# Patient Record
Sex: FEMALE | Race: BLACK OR AFRICAN AMERICAN | NOT HISPANIC OR LATINO | URBAN - METROPOLITAN AREA
[De-identification: names, ages, dates, MRNs, and addresses within clinical notes are randomized per-mention and may not be internally consistent; named-entity substitution may affect disease eponyms.]

---

## 2017-01-06 ENCOUNTER — EMERGENCY (EMERGENCY)
Facility: HOSPITAL | Age: 27
LOS: 1 days | Discharge: HOME | End: 2017-01-06
Attending: EMERGENCY MEDICINE | Admitting: EMERGENCY MEDICINE
Payer: COMMERCIAL

## 2017-01-06 VITALS
RESPIRATION RATE: 18 BRPM | HEART RATE: 80 BPM | DIASTOLIC BLOOD PRESSURE: 79 MMHG | SYSTOLIC BLOOD PRESSURE: 127 MMHG | TEMPERATURE: 98 F | OXYGEN SATURATION: 100 %

## 2017-01-06 PROCEDURE — 99283 EMERGENCY DEPT VISIT LOW MDM: CPT

## 2017-01-06 PROCEDURE — 73080 X-RAY EXAM OF ELBOW: CPT | Mod: 26,RT

## 2017-01-06 RX ORDER — IBUPROFEN 200 MG
600 TABLET ORAL ONCE
Qty: 0 | Refills: 0 | Status: COMPLETED | OUTPATIENT
Start: 2017-01-06 | End: 2017-01-06

## 2017-01-06 RX ORDER — DIAZEPAM 5 MG
1 TABLET ORAL
Qty: 10 | Refills: 0
Start: 2017-01-06

## 2017-01-06 RX ORDER — IBUPROFEN 200 MG
1 TABLET ORAL
Qty: 15 | Refills: 0
Start: 2017-01-06

## 2017-01-06 RX ADMIN — Medication 600 MILLIGRAM(S): at 11:33

## 2017-01-06 RX ADMIN — Medication 600 MILLIGRAM(S): at 10:32

## 2017-01-06 NOTE — ED PROVIDER NOTE - CARE PLAN
Principal Discharge DX:	Elbow pain  Instructions for follow-up, activity and diet:	Follow up with your Doctor in 1-2 days.  Follow up with orthopedics in 1-2 days.  Rest.  Ice 3-4 x a day.  Sling for a maximum of 2 days.  Take Motrin 600mg orally every 8 hours as needed for pain take with food.  Valium 5mg one tablet orally every 8 hours as needed for spasm don’t drive or drink alcohol while taking this medication.  Return to the ER for any persistent/worsening or new symptoms weakness, numbness or any concerning symptoms. Principal Discharge DX:	Elbow strain, right, initial encounter  Instructions for follow-up, activity and diet:	Follow up with your Doctor in 1-2 days.  Follow up with orthopedics in 1-2 days.  Rest.  Ice 3-4 x a day.  Sling for a maximum of 2 days.  Take Motrin 600mg orally every 8 hours as needed for pain take with food.  Valium 5mg one tablet orally every 8 hours as needed for spasm don’t drive or drink alcohol while taking this medication.  Return to the ER for any persistent/worsening or new symptoms weakness, numbness or any concerning symptoms.

## 2017-01-06 NOTE — ED PROVIDER NOTE - OBJECTIVE STATEMENT
27 y/o F pt, right hand dominant, with no significant PMHx presents to the ED for right elbow pain described as throbbing x2 days. States grabbing things at work. States applying ice to the affected area which provided no relief. Has not taken any pain medication. Denies fever, chills, trauma, fall, or any other complaints. NKDA. LMP: 2 weeks ago. Denies chance of pregnancy.

## 2017-01-06 NOTE — ED PROVIDER NOTE - NS ED MD SCRIBE ATTENDING SCRIBE SECTIONS
REVIEW OF SYSTEMS/HIV/HISTORY OF PRESENT ILLNESS/PHYSICAL EXAM/PAST MEDICAL/SURGICAL/SOCIAL HISTORY/DISPOSITION/VITAL SIGNS( Pullset)

## 2017-01-06 NOTE — ED PROVIDER NOTE - PLAN OF CARE
Follow up with your Doctor in 1-2 days.  Follow up with orthopedics in 1-2 days.  Rest.  Ice 3-4 x a day.  Sling for a maximum of 2 days.  Take Motrin 600mg orally every 8 hours as needed for pain take with food.  Valium 5mg one tablet orally every 8 hours as needed for spasm don’t drive or drink alcohol while taking this medication.  Return to the ER for any persistent/worsening or new symptoms weakness, numbness or any concerning symptoms.

## 2017-01-06 NOTE — ED PROCEDURE NOTE - CPROC ED PERI NEUROVAS EVAL1
Pre-application: Motor, sensory, and vascular responses intact in the injured extremity ./Post-application: Motor, sensory, and vascular responses intact in the injured extremity.

## 2017-01-06 NOTE — ED ADULT TRIAGE NOTE - CHIEF COMPLAINT QUOTE
Atraumatic left elbow pain x 2 days. Positive radial pulse noted Atraumatic left elbow pain x 2 days. Positive radial pulse noted.  Recent travel to [facility].

## 2017-01-06 NOTE — ED PROVIDER NOTE - PROGRESS NOTE DETAILS
AIDEE Walker: pt feels better pain improved.  X-ray negative no fracture no dislocation.  Able to flex and extend elbow.  Sling placed.  Results reviewed and provided to patient.  Discharge reviewed and discussed with patient.

## 2017-01-06 NOTE — ED PROVIDER NOTE - UPPER EXTREMITY EXAM, RIGHT
Limited extension. Pain with pronation and supination. Tightness at antecubital space. Normal abduction and adduction.

## 2018-01-04 ENCOUNTER — APPOINTMENT (OUTPATIENT)
Dept: DERMATOLOGY | Facility: CLINIC | Age: 28
End: 2018-01-04

## 2018-02-11 ENCOUNTER — RESULT REVIEW (OUTPATIENT)
Age: 28
End: 2018-02-11

## 2018-06-13 ENCOUNTER — TRANSCRIPTION ENCOUNTER (OUTPATIENT)
Age: 28
End: 2018-06-13

## 2018-10-08 ENCOUNTER — APPOINTMENT (OUTPATIENT)
Dept: OPHTHALMOLOGY | Facility: CLINIC | Age: 28
End: 2018-10-08
Payer: COMMERCIAL

## 2018-10-08 DIAGNOSIS — H00.024 HORDEOLUM INTERNUM LEFT UPPER EYELID: ICD-10-CM

## 2018-10-08 PROCEDURE — 99203 OFFICE O/P NEW LOW 30 MIN: CPT

## 2018-10-22 ENCOUNTER — APPOINTMENT (OUTPATIENT)
Dept: OPHTHALMOLOGY | Facility: CLINIC | Age: 28
End: 2018-10-22
Payer: COMMERCIAL

## 2018-10-22 PROCEDURE — 92133 CPTRZD OPH DX IMG PST SGM ON: CPT

## 2018-10-22 PROCEDURE — 92014 COMPRE OPH EXAM EST PT 1/>: CPT

## 2018-10-22 PROCEDURE — 76514 ECHO EXAM OF EYE THICKNESS: CPT

## 2018-10-22 PROCEDURE — 92083 EXTENDED VISUAL FIELD XM: CPT

## 2019-01-11 ENCOUNTER — EMERGENCY (EMERGENCY)
Facility: HOSPITAL | Age: 29
LOS: 1 days | Discharge: HOME | End: 2019-01-11
Attending: EMERGENCY MEDICINE | Admitting: EMERGENCY MEDICINE
Payer: COMMERCIAL

## 2019-01-11 VITALS
TEMPERATURE: 98 F | SYSTOLIC BLOOD PRESSURE: 146 MMHG | DIASTOLIC BLOOD PRESSURE: 87 MMHG | OXYGEN SATURATION: 100 % | RESPIRATION RATE: 18 BRPM | HEART RATE: 73 BPM

## 2019-01-11 VITALS
RESPIRATION RATE: 18 BRPM | DIASTOLIC BLOOD PRESSURE: 95 MMHG | OXYGEN SATURATION: 100 % | TEMPERATURE: 99 F | SYSTOLIC BLOOD PRESSURE: 139 MMHG | HEART RATE: 92 BPM

## 2019-01-11 LAB
ALBUMIN SERPL ELPH-MCNC: 4.3 G/DL — SIGNIFICANT CHANGE UP (ref 3.3–5)
ALP SERPL-CCNC: 81 U/L — SIGNIFICANT CHANGE UP (ref 40–120)
ALT FLD-CCNC: 23 U/L — SIGNIFICANT CHANGE UP (ref 4–33)
ANION GAP SERPL CALC-SCNC: 10 MEQ/L — SIGNIFICANT CHANGE UP (ref 7–14)
APPEARANCE UR: CLEAR — SIGNIFICANT CHANGE UP
APTT BLD: 38.6 SEC — HIGH (ref 27.5–36.3)
AST SERPL-CCNC: 18 U/L — SIGNIFICANT CHANGE UP (ref 4–32)
BASOPHILS # BLD AUTO: 0.04 K/UL — SIGNIFICANT CHANGE UP (ref 0–0.2)
BASOPHILS NFR BLD AUTO: 0.5 % — SIGNIFICANT CHANGE UP (ref 0–2)
BILIRUB SERPL-MCNC: 0.3 MG/DL — SIGNIFICANT CHANGE UP (ref 0.2–1.2)
BILIRUB UR-MCNC: NEGATIVE — SIGNIFICANT CHANGE UP
BLD GP AB SCN SERPL QL: NEGATIVE — SIGNIFICANT CHANGE UP
BLOOD UR QL VISUAL: SIGNIFICANT CHANGE UP
BUN SERPL-MCNC: 8 MG/DL — SIGNIFICANT CHANGE UP (ref 7–23)
CALCIUM SERPL-MCNC: 10 MG/DL — SIGNIFICANT CHANGE UP (ref 8.4–10.5)
CHLORIDE SERPL-SCNC: 104 MMOL/L — SIGNIFICANT CHANGE UP (ref 98–107)
CO2 SERPL-SCNC: 24 MMOL/L — SIGNIFICANT CHANGE UP (ref 22–31)
COLOR SPEC: SIGNIFICANT CHANGE UP
CREAT SERPL-MCNC: 0.72 MG/DL — SIGNIFICANT CHANGE UP (ref 0.5–1.3)
EOSINOPHIL # BLD AUTO: 0.28 K/UL — SIGNIFICANT CHANGE UP (ref 0–0.5)
EOSINOPHIL NFR BLD AUTO: 3.2 % — SIGNIFICANT CHANGE UP (ref 0–6)
GLUCOSE SERPL-MCNC: 92 MG/DL — SIGNIFICANT CHANGE UP (ref 70–99)
GLUCOSE UR-MCNC: NEGATIVE — SIGNIFICANT CHANGE UP
HCT VFR BLD CALC: 41.8 % — SIGNIFICANT CHANGE UP (ref 34.5–45)
HGB BLD-MCNC: 14.1 G/DL — SIGNIFICANT CHANGE UP (ref 11.5–15.5)
IMM GRANULOCYTES NFR BLD AUTO: 0.2 % — SIGNIFICANT CHANGE UP (ref 0–1.5)
INR BLD: 1.03 — SIGNIFICANT CHANGE UP (ref 0.88–1.17)
KETONES UR-MCNC: NEGATIVE — SIGNIFICANT CHANGE UP
LEUKOCYTE ESTERASE UR-ACNC: NEGATIVE — SIGNIFICANT CHANGE UP
LIDOCAIN IGE QN: 27.9 U/L — SIGNIFICANT CHANGE UP (ref 7–60)
LYMPHOCYTES # BLD AUTO: 4.28 K/UL — HIGH (ref 1–3.3)
LYMPHOCYTES # BLD AUTO: 48.5 % — HIGH (ref 13–44)
MCHC RBC-ENTMCNC: 29.9 PG — SIGNIFICANT CHANGE UP (ref 27–34)
MCHC RBC-ENTMCNC: 33.7 % — SIGNIFICANT CHANGE UP (ref 32–36)
MCV RBC AUTO: 88.7 FL — SIGNIFICANT CHANGE UP (ref 80–100)
MONOCYTES # BLD AUTO: 0.72 K/UL — SIGNIFICANT CHANGE UP (ref 0–0.9)
MONOCYTES NFR BLD AUTO: 8.2 % — SIGNIFICANT CHANGE UP (ref 2–14)
NEUTROPHILS # BLD AUTO: 3.48 K/UL — SIGNIFICANT CHANGE UP (ref 1.8–7.4)
NEUTROPHILS NFR BLD AUTO: 39.4 % — LOW (ref 43–77)
NITRITE UR-MCNC: NEGATIVE — SIGNIFICANT CHANGE UP
NRBC # FLD: 0 K/UL — LOW (ref 25–125)
PH UR: 6 — SIGNIFICANT CHANGE UP (ref 5–8)
PLATELET # BLD AUTO: 314 K/UL — SIGNIFICANT CHANGE UP (ref 150–400)
PMV BLD: 10.4 FL — SIGNIFICANT CHANGE UP (ref 7–13)
POTASSIUM SERPL-MCNC: 3.9 MMOL/L — SIGNIFICANT CHANGE UP (ref 3.5–5.3)
POTASSIUM SERPL-SCNC: 3.9 MMOL/L — SIGNIFICANT CHANGE UP (ref 3.5–5.3)
PROT SERPL-MCNC: 7.5 G/DL — SIGNIFICANT CHANGE UP (ref 6–8.3)
PROT UR-MCNC: NEGATIVE — SIGNIFICANT CHANGE UP
PROTHROM AB SERPL-ACNC: 11.7 SEC — SIGNIFICANT CHANGE UP (ref 9.8–13.1)
RBC # BLD: 4.71 M/UL — SIGNIFICANT CHANGE UP (ref 3.8–5.2)
RBC # FLD: 12 % — SIGNIFICANT CHANGE UP (ref 10.3–14.5)
RBC CASTS # UR COMP ASSIST: SIGNIFICANT CHANGE UP (ref 0–?)
RH IG SCN BLD-IMP: POSITIVE — SIGNIFICANT CHANGE UP
SODIUM SERPL-SCNC: 138 MMOL/L — SIGNIFICANT CHANGE UP (ref 135–145)
SP GR SPEC: 1.02 — SIGNIFICANT CHANGE UP (ref 1–1.04)
UROBILINOGEN FLD QL: NORMAL — SIGNIFICANT CHANGE UP
WBC # BLD: 8.82 K/UL — SIGNIFICANT CHANGE UP (ref 3.8–10.5)
WBC # FLD AUTO: 8.82 K/UL — SIGNIFICANT CHANGE UP (ref 3.8–10.5)
WBC UR QL: SIGNIFICANT CHANGE UP (ref 0–?)

## 2019-01-11 PROCEDURE — 74177 CT ABD & PELVIS W/CONTRAST: CPT | Mod: 26

## 2019-01-11 PROCEDURE — 76830 TRANSVAGINAL US NON-OB: CPT | Mod: 26

## 2019-01-11 PROCEDURE — 99284 EMERGENCY DEPT VISIT MOD MDM: CPT | Mod: 25

## 2019-01-11 PROCEDURE — 76705 ECHO EXAM OF ABDOMEN: CPT | Mod: 26

## 2019-01-11 RX ORDER — SODIUM CHLORIDE 9 MG/ML
1000 INJECTION, SOLUTION INTRAVENOUS ONCE
Qty: 0 | Refills: 0 | Status: COMPLETED | OUTPATIENT
Start: 2019-01-11 | End: 2019-01-11

## 2019-01-11 RX ORDER — MORPHINE SULFATE 50 MG/1
2 CAPSULE, EXTENDED RELEASE ORAL ONCE
Qty: 0 | Refills: 0 | Status: DISCONTINUED | OUTPATIENT
Start: 2019-01-11 | End: 2019-01-11

## 2019-01-11 RX ADMIN — SODIUM CHLORIDE 2000 MILLILITER(S): 9 INJECTION, SOLUTION INTRAVENOUS at 02:36

## 2019-01-11 NOTE — ED ADULT TRIAGE NOTE - CHIEF COMPLAINT QUOTE
pt. c/o dull RLQ pain x 2 weeks, turned sharp over the past 2 days, reports nausea w/out vomiting, no diarrhea or urinary symptoms, LMP 2 weeks ago.  PMHx asthma.

## 2019-01-11 NOTE — ED ADULT NURSE REASSESSMENT NOTE - NS ED NURSE REASSESS COMMENT FT1
Pt reports abd pain 7/10. Pt refusal of pain medications, reports pain is at a tolerable level. Pt appears resting, comfortable, will continue to monitor.

## 2019-01-11 NOTE — ED PROVIDER NOTE - CARE PLAN
Principal Discharge DX:	Right lower quadrant abdominal pain  Goal:	RLQ pain  Assessment and plan of treatment:	Imaging negative for pancreatitis, cholecystitis, appendicitis, ovarian cysts, torsion or pregnancy. Might be related to menses

## 2019-01-11 NOTE — ED PROVIDER NOTE - PLAN OF CARE
RLQ pain Imaging negative for pancreatitis, cholecystitis, appendicitis, ovarian cysts, torsion or pregnancy. Might be related to menses

## 2019-01-11 NOTE — ED PROVIDER NOTE - NSFOLLOWUPINSTRUCTIONS_ED_ALL_ED_FT
Please followup with your OBGYN within one week for possible endometriosis and Primary care doctor. The abdominal pain was not due to pancreatitis, cholecystitis, appendicitis, ovarian cysts, torsion or pregnancy. If worsening abdominal pain and start to have a fever then please call your doctor consider coming back to the ED

## 2019-01-11 NOTE — ED PROVIDER NOTE - OBJECTIVE STATEMENT
27y/o F w/ no PMH presents w/ RLQ pain. 29y/o F w/ no PMH presents w/ RLQ pain. She was having RLQ pain starting 2 weeks ago and it was dull during that time and didn't think much of it. The starting 1/10 she developed sharp lower back pain at work and it became constant and increasing. She also has associated nausea but no vomiting, no dysuria, f/CP/SOB sick contacts, recent travels and still is able to tolerate PO, last ate at 10pm. 29y/o F w/ no PMH presents w/ RLQ pain. She was having RLQ pain starting 2 weeks ago and it was dull during that time and didn't think much of it. The starting 1/10 she developed sharp lower back pain at work and it became constant and increasing. She also has associated nausea but no vomiting, no dysuria, f/CP/SOB sick contacts, recent travels and still is able to tolerate PO, last ate at 10pm. No vaginal discharge, dysuria, last 27y/o F w/ no PMH presents w/ RLQ pain. She was having RLQ pain starting 2 weeks ago and it was dull during that time and didn't think much of it. The starting 1/10 she developed sharp lower back pain at work and it became constant and increasing. She also has associated nausea but no vomiting, no dysuria, f/CP/SOB sick contacts, recent travels and still is able to tolerate PO, last ate at 10pm. No vaginal discharge, dysuria, last sexual activity 1 yr ago and neg STDs as per her annual exam w/ OBGYN and LMP 2 wks ago. 27y/o F w/ no PMH presents w/ RLQ pain. She was having RLQ pain starting 2 weeks ago and it was dull during that time and didn't think much of it. The starting 1/10 she developed sharp lower back and RLQ pain at work and it became constant and increasing. She also has associated nausea but no vomiting, no dysuria, f/CP/SOB sick contacts, recent travels and still is able to tolerate PO, last ate at 10pm. No vaginal discharge, dysuria, last sexual activity 1 yr ago and neg STDs as per her annual exam w/ OBGYN and LMP 2 wks ago.

## 2019-01-11 NOTE — ED PROVIDER NOTE - MEDICAL DECISION MAKING DETAILS
29y/o F w/ no PMH presents w/ RLQ pain, r/o cholecystits, appendicitis and pancreatitis f/u labs, lipase, US and CTAP

## 2019-01-11 NOTE — ED PROVIDER NOTE - ATTENDING CONTRIBUTION TO CARE
MD Quintana:  I performed a face to face bedside interview with patient regarding history of present illness, review of symptoms and past medical history. I completed an independent physical exam(documented below).  I have discussed patient's plan of care with resident.   I agree with note as stated above, having amended the EMR as needed to reflect my findings. I have discussed the assessment and plan of care.  This includes during the time I functioned as the attending physician for this patient.  PE:  Gen: Alert, NAD  Head: NC, AT,  EOMI, normal lids/conjunctiva  ENT:  normal hearing, patent oropharynx without erythema/exudate  Neck: +supple, no tenderness/meningismus/JVD, +Trachea midline  Chest: no chest wall tenderness, equal chest rise  Pulm: Bilateral BS, normal resp effort, no wheeze/stridor/retractions  CV: RRR, no M/R/G, +dist pulses  Abd: +BS, soft, ND, +ttp in RLQ >RUQ>epigastric  Rectal: deferred  Mskel: no edema/erythema/cyanosis  Skin: no rash  Neuro: AAOx3  MDM:   27yo F, denies significant pmh, c/o RLQ pain X 2 wks, dull, constant, associated w/ lower back pain, +nausea but no emesis. LMP 2 wks ago. negative ucg. Ddx includes msk pain vs renal stone vs cholecystitis/pancreatitis vs acute appy vs ovarian pathology. Labs, meds, fluids, imaging, reassess.

## 2019-01-16 ENCOUNTER — APPOINTMENT (OUTPATIENT)
Dept: OPHTHALMOLOGY | Facility: CLINIC | Age: 29
End: 2019-01-16
Payer: COMMERCIAL

## 2019-01-16 PROCEDURE — 92012 INTRM OPH EXAM EST PATIENT: CPT

## 2019-01-16 PROCEDURE — 92082 INTERMEDIATE VISUAL FIELD XM: CPT

## 2019-01-16 PROCEDURE — ZZZZZ: CPT

## 2019-06-25 ENCOUNTER — EMERGENCY (EMERGENCY)
Facility: HOSPITAL | Age: 29
LOS: 1 days | Discharge: HOME | End: 2019-06-25
Attending: EMERGENCY MEDICINE | Admitting: EMERGENCY MEDICINE
Payer: COMMERCIAL

## 2019-06-25 VITALS
DIASTOLIC BLOOD PRESSURE: 77 MMHG | TEMPERATURE: 98 F | RESPIRATION RATE: 16 BRPM | SYSTOLIC BLOOD PRESSURE: 133 MMHG | OXYGEN SATURATION: 99 % | HEART RATE: 77 BPM

## 2019-06-25 VITALS
WEIGHT: 223.99 LBS | DIASTOLIC BLOOD PRESSURE: 98 MMHG | RESPIRATION RATE: 16 BRPM | HEIGHT: 67 IN | TEMPERATURE: 98 F | HEART RATE: 95 BPM | SYSTOLIC BLOOD PRESSURE: 144 MMHG

## 2019-06-25 LAB
ALBUMIN SERPL ELPH-MCNC: 4.3 G/DL — SIGNIFICANT CHANGE UP (ref 3.3–5)
ALP SERPL-CCNC: 78 U/L — SIGNIFICANT CHANGE UP (ref 40–120)
ALT FLD-CCNC: 27 U/L — SIGNIFICANT CHANGE UP (ref 4–33)
ANION GAP SERPL CALC-SCNC: 12 MMO/L — SIGNIFICANT CHANGE UP (ref 7–14)
APPEARANCE UR: CLEAR — SIGNIFICANT CHANGE UP
AST SERPL-CCNC: 28 U/L — SIGNIFICANT CHANGE UP (ref 4–32)
BASOPHILS # BLD AUTO: 0.06 K/UL — SIGNIFICANT CHANGE UP (ref 0–0.2)
BASOPHILS NFR BLD AUTO: 0.9 % — SIGNIFICANT CHANGE UP (ref 0–2)
BILIRUB SERPL-MCNC: 0.8 MG/DL — SIGNIFICANT CHANGE UP (ref 0.2–1.2)
BILIRUB UR-MCNC: NEGATIVE — SIGNIFICANT CHANGE UP
BLOOD UR QL VISUAL: SIGNIFICANT CHANGE UP
BUN SERPL-MCNC: 8 MG/DL — SIGNIFICANT CHANGE UP (ref 7–23)
CALCIUM SERPL-MCNC: 9.7 MG/DL — SIGNIFICANT CHANGE UP (ref 8.4–10.5)
CHLORIDE SERPL-SCNC: 100 MMOL/L — SIGNIFICANT CHANGE UP (ref 98–107)
CO2 SERPL-SCNC: 24 MMOL/L — SIGNIFICANT CHANGE UP (ref 22–31)
COLOR SPEC: SIGNIFICANT CHANGE UP
CREAT SERPL-MCNC: 0.67 MG/DL — SIGNIFICANT CHANGE UP (ref 0.5–1.3)
EOSINOPHIL # BLD AUTO: 0.35 K/UL — SIGNIFICANT CHANGE UP (ref 0–0.5)
EOSINOPHIL NFR BLD AUTO: 5.2 % — SIGNIFICANT CHANGE UP (ref 0–6)
GLUCOSE SERPL-MCNC: 77 MG/DL — SIGNIFICANT CHANGE UP (ref 70–99)
GLUCOSE UR-MCNC: NEGATIVE — SIGNIFICANT CHANGE UP
HCT VFR BLD CALC: 42.5 % — SIGNIFICANT CHANGE UP (ref 34.5–45)
HGB BLD-MCNC: 14.1 G/DL — SIGNIFICANT CHANGE UP (ref 11.5–15.5)
IMM GRANULOCYTES NFR BLD AUTO: 0.4 % — SIGNIFICANT CHANGE UP (ref 0–1.5)
KETONES UR-MCNC: NEGATIVE — SIGNIFICANT CHANGE UP
LEUKOCYTE ESTERASE UR-ACNC: NEGATIVE — SIGNIFICANT CHANGE UP
LYMPHOCYTES # BLD AUTO: 3 K/UL — SIGNIFICANT CHANGE UP (ref 1–3.3)
LYMPHOCYTES # BLD AUTO: 44.5 % — HIGH (ref 13–44)
MCHC RBC-ENTMCNC: 29.4 PG — SIGNIFICANT CHANGE UP (ref 27–34)
MCHC RBC-ENTMCNC: 33.2 % — SIGNIFICANT CHANGE UP (ref 32–36)
MCV RBC AUTO: 88.7 FL — SIGNIFICANT CHANGE UP (ref 80–100)
MONOCYTES # BLD AUTO: 0.56 K/UL — SIGNIFICANT CHANGE UP (ref 0–0.9)
MONOCYTES NFR BLD AUTO: 8.3 % — SIGNIFICANT CHANGE UP (ref 2–14)
NEUTROPHILS # BLD AUTO: 2.74 K/UL — SIGNIFICANT CHANGE UP (ref 1.8–7.4)
NEUTROPHILS NFR BLD AUTO: 40.7 % — LOW (ref 43–77)
NITRITE UR-MCNC: NEGATIVE — SIGNIFICANT CHANGE UP
NRBC # FLD: 0.02 K/UL — SIGNIFICANT CHANGE UP (ref 0–0)
PH UR: 6 — SIGNIFICANT CHANGE UP (ref 5–8)
PLATELET # BLD AUTO: 352 K/UL — SIGNIFICANT CHANGE UP (ref 150–400)
PMV BLD: 10.6 FL — SIGNIFICANT CHANGE UP (ref 7–13)
POTASSIUM SERPL-MCNC: 4 MMOL/L — SIGNIFICANT CHANGE UP (ref 3.5–5.3)
POTASSIUM SERPL-SCNC: 4 MMOL/L — SIGNIFICANT CHANGE UP (ref 3.5–5.3)
PROT SERPL-MCNC: 7.6 G/DL — SIGNIFICANT CHANGE UP (ref 6–8.3)
PROT UR-MCNC: NEGATIVE — SIGNIFICANT CHANGE UP
RBC # BLD: 4.79 M/UL — SIGNIFICANT CHANGE UP (ref 3.8–5.2)
RBC # FLD: 12.3 % — SIGNIFICANT CHANGE UP (ref 10.3–14.5)
SODIUM SERPL-SCNC: 136 MMOL/L — SIGNIFICANT CHANGE UP (ref 135–145)
SP GR SPEC: 1.01 — SIGNIFICANT CHANGE UP (ref 1–1.04)
UROBILINOGEN FLD QL: NORMAL — SIGNIFICANT CHANGE UP
WBC # BLD: 6.74 K/UL — SIGNIFICANT CHANGE UP (ref 3.8–10.5)
WBC # FLD AUTO: 6.74 K/UL — SIGNIFICANT CHANGE UP (ref 3.8–10.5)

## 2019-06-25 PROCEDURE — 76512 OPH US DX B-SCAN: CPT | Mod: 26,RT

## 2019-06-25 PROCEDURE — 99284 EMERGENCY DEPT VISIT MOD MDM: CPT | Mod: 25

## 2019-06-25 PROCEDURE — 71046 X-RAY EXAM CHEST 2 VIEWS: CPT | Mod: 26

## 2019-06-25 PROCEDURE — 70450 CT HEAD/BRAIN W/O DYE: CPT | Mod: 26

## 2019-06-25 NOTE — ED ADULT TRIAGE NOTE - CHIEF COMPLAINT QUOTE
Pt states her b/p is high and she feels like she is going to pass out pt well appearing in triage denies any pain   PMH: denies

## 2019-06-25 NOTE — ED PROVIDER NOTE - CLINICAL SUMMARY MEDICAL DECISION MAKING FREE TEXT BOX
28 y.o female pmhx of asthma coming in with feeling lightheaded/dizziness for the last 2 days. Exam unremarkable. Will check basic labs, ekg, xray chest, CT head, reassess.

## 2019-06-25 NOTE — ED PROVIDER NOTE - OBJECTIVE STATEMENT
28 y.o female pmhx of asthma coming in with feeling lightheaded/dizziness for the last 2 days. Pt states last night had gradual onset of headache that resolved on its own , checked BP and states was elevated. States was at work today and felt the dizziness sensation, went to drive to her doctor but felt like she was going to pass out so came to the ED. Denies fevers, chills, blurry vision, chest pain, sob, cough, n/v/d, abdominal pain, numbness, tingling, weakness, urinary symptoms. Denies any chance of pregnancy. Wears contacts for distance. Family hx of HTN.

## 2019-06-25 NOTE — ED PROVIDER NOTE - NSFOLLOWUPINSTRUCTIONS_ED_ALL_ED_FT
Rest, drink plenty of fluids.   Follow up with your primary care physician in 48-72 hours- bring copies of your results.   Follow up with Neurology- referral list provided.   Return to the Emergency Department for worsening or persistent symptoms OR ANY NEW OR CONCERNING SYMPTOMS.

## 2019-06-25 NOTE — ED PROVIDER NOTE - PROGRESS NOTE DETAILS
beau ortiz: labs and imaging wnl. pt ambulating, feel well would like to go. Will dc home with neuro follow up. return precautions given. feels improved, POCT ocular US shows optic nerve sheath diameter wnl, w/u negative.

## 2019-06-27 ENCOUNTER — APPOINTMENT (OUTPATIENT)
Dept: DERMATOLOGY | Facility: CLINIC | Age: 29
End: 2019-06-27
Payer: COMMERCIAL

## 2019-06-27 VITALS — BODY MASS INDEX: 35.16 KG/M2 | HEIGHT: 67 IN | WEIGHT: 224 LBS

## 2019-06-27 PROCEDURE — 99203 OFFICE O/P NEW LOW 30 MIN: CPT

## 2019-07-02 LAB
ALBUMIN SERPL ELPH-MCNC: 4.4 G/DL
ALP BLD-CCNC: 80 U/L
ALT SERPL-CCNC: 23 U/L
ANION GAP SERPL CALC-SCNC: 10 MMOL/L
AST SERPL-CCNC: 19 U/L
BILIRUB SERPL-MCNC: 0.5 MG/DL
BUN SERPL-MCNC: 8 MG/DL
CALCIUM SERPL-MCNC: 9.9 MG/DL
CHLORIDE SERPL-SCNC: 103 MMOL/L
CO2 SERPL-SCNC: 24 MMOL/L
CREAT SERPL-MCNC: 0.75 MG/DL
POTASSIUM SERPL-SCNC: 4.7 MMOL/L
PROT SERPL-MCNC: 7.4 G/DL
SODIUM SERPL-SCNC: 137 MMOL/L
TESTOST SERPL-MCNC: 29.3 NG/DL

## 2019-07-16 LAB — DHEA-SULFATE, SERUM: 208 UG/DL

## 2019-08-22 ENCOUNTER — RESULT REVIEW (OUTPATIENT)
Age: 29
End: 2019-08-22

## 2019-09-03 ENCOUNTER — NON-APPOINTMENT (OUTPATIENT)
Age: 29
End: 2019-09-03

## 2019-09-03 ENCOUNTER — APPOINTMENT (OUTPATIENT)
Dept: OPHTHALMOLOGY | Facility: CLINIC | Age: 29
End: 2019-09-03
Payer: COMMERCIAL

## 2019-09-03 PROCEDURE — 92310B: CUSTOM

## 2019-09-10 ENCOUNTER — APPOINTMENT (OUTPATIENT)
Dept: OPHTHALMOLOGY | Facility: CLINIC | Age: 29
End: 2019-09-10
Payer: COMMERCIAL

## 2019-09-10 ENCOUNTER — NON-APPOINTMENT (OUTPATIENT)
Age: 29
End: 2019-09-10

## 2019-09-10 PROCEDURE — 92083 EXTENDED VISUAL FIELD XM: CPT

## 2019-09-10 PROCEDURE — 76514 ECHO EXAM OF EYE THICKNESS: CPT

## 2019-09-10 PROCEDURE — 92012 INTRM OPH EXAM EST PATIENT: CPT

## 2019-09-10 PROCEDURE — 92133 CPTRZD OPH DX IMG PST SGM ON: CPT

## 2019-09-19 ENCOUNTER — APPOINTMENT (OUTPATIENT)
Dept: OPHTHALMOLOGY | Facility: CLINIC | Age: 29
End: 2019-09-19

## 2019-10-01 ENCOUNTER — APPOINTMENT (OUTPATIENT)
Dept: DERMATOLOGY | Facility: CLINIC | Age: 29
End: 2019-10-01

## 2019-10-03 ENCOUNTER — APPOINTMENT (OUTPATIENT)
Dept: ULTRASOUND IMAGING | Facility: HOSPITAL | Age: 29
End: 2019-10-03

## 2019-10-22 ENCOUNTER — OUTPATIENT (OUTPATIENT)
Dept: OUTPATIENT SERVICES | Facility: HOSPITAL | Age: 29
LOS: 1 days | End: 2019-10-22
Payer: COMMERCIAL

## 2019-10-22 ENCOUNTER — APPOINTMENT (OUTPATIENT)
Dept: ULTRASOUND IMAGING | Facility: IMAGING CENTER | Age: 29
End: 2019-10-22
Payer: COMMERCIAL

## 2019-10-22 DIAGNOSIS — Z00.8 ENCOUNTER FOR OTHER GENERAL EXAMINATION: ICD-10-CM

## 2019-10-22 PROCEDURE — 76856 US EXAM PELVIC COMPLETE: CPT

## 2019-10-22 PROCEDURE — 76856 US EXAM PELVIC COMPLETE: CPT | Mod: 26

## 2019-11-14 ENCOUNTER — NON-APPOINTMENT (OUTPATIENT)
Age: 29
End: 2019-11-14

## 2019-11-14 ENCOUNTER — APPOINTMENT (OUTPATIENT)
Dept: OPHTHALMOLOGY | Facility: CLINIC | Age: 29
End: 2019-11-14
Payer: COMMERCIAL

## 2019-11-14 PROCEDURE — V2520B2: CUSTOM

## 2019-11-14 PROCEDURE — 92331: CPT | Mod: NC

## 2021-08-21 ENCOUNTER — APPOINTMENT (OUTPATIENT)
Dept: DISASTER EMERGENCY | Facility: OTHER | Age: 31
End: 2021-08-21

## 2021-08-28 ENCOUNTER — APPOINTMENT (OUTPATIENT)
Dept: DISASTER EMERGENCY | Facility: OTHER | Age: 31
End: 2021-08-28

## 2021-09-11 ENCOUNTER — APPOINTMENT (OUTPATIENT)
Dept: DISASTER EMERGENCY | Facility: OTHER | Age: 31
End: 2021-09-11

## 2021-09-18 ENCOUNTER — APPOINTMENT (OUTPATIENT)
Dept: DISASTER EMERGENCY | Facility: OTHER | Age: 31
End: 2021-09-18

## 2021-09-21 ENCOUNTER — APPOINTMENT (OUTPATIENT)
Age: 31
End: 2021-09-21

## 2021-10-02 ENCOUNTER — APPOINTMENT (OUTPATIENT)
Dept: DISASTER EMERGENCY | Facility: OTHER | Age: 31
End: 2021-10-02

## 2021-10-12 ENCOUNTER — APPOINTMENT (OUTPATIENT)
Age: 31
End: 2021-10-12

## 2023-03-28 NOTE — ED PROVIDER NOTE - MEDICAL DECISION MAKING DETAILS
Will give Motrin and obtain XR elbow. Consent (Spinal Accessory)/Introductory Paragraph: The rationale for Mohs was explained to the patient and consent was obtained. The risks, benefits and alternatives to therapy were discussed in detail. Specifically, the risks of damage to the spinal accessory nerve, infection, scarring, bleeding, prolonged wound healing, incomplete removal, allergy to anesthesia, and recurrence were addressed. Prior to the procedure, the treatment site was clearly identified and confirmed by the patient. All components of Universal Protocol/PAUSE Rule completed.

## 2023-07-26 ENCOUNTER — INPATIENT (INPATIENT)
Facility: HOSPITAL | Age: 33
LOS: 4 days | Discharge: HOME | End: 2023-07-31
Attending: INTERNAL MEDICINE | Admitting: INTERNAL MEDICINE
Payer: MEDICAID

## 2023-07-26 VITALS
OXYGEN SATURATION: 98 % | DIASTOLIC BLOOD PRESSURE: 68 MMHG | RESPIRATION RATE: 18 BRPM | HEIGHT: 66 IN | TEMPERATURE: 102 F | HEART RATE: 106 BPM | WEIGHT: 156.97 LBS | SYSTOLIC BLOOD PRESSURE: 102 MMHG

## 2023-07-26 DIAGNOSIS — E87.6 HYPOKALEMIA: ICD-10-CM

## 2023-07-26 DIAGNOSIS — K52.9 NONINFECTIVE GASTROENTERITIS AND COLITIS, UNSPECIFIED: ICD-10-CM

## 2023-07-26 DIAGNOSIS — D27.0 BENIGN NEOPLASM OF RIGHT OVARY: ICD-10-CM

## 2023-07-26 LAB
ALBUMIN SERPL ELPH-MCNC: 3.2 G/DL — LOW (ref 3.3–5)
ALP SERPL-CCNC: 63 U/L — SIGNIFICANT CHANGE UP (ref 40–120)
ALT FLD-CCNC: 23 U/L — SIGNIFICANT CHANGE UP (ref 12–78)
ANION GAP SERPL CALC-SCNC: 5 MMOL/L — SIGNIFICANT CHANGE UP (ref 5–17)
ANISOCYTOSIS BLD QL: SLIGHT — SIGNIFICANT CHANGE UP
ANISOCYTOSIS BLD QL: SLIGHT — SIGNIFICANT CHANGE UP
APPEARANCE UR: ABNORMAL
AST SERPL-CCNC: 23 U/L — SIGNIFICANT CHANGE UP (ref 15–37)
BACTERIA # UR AUTO: ABNORMAL
BASOPHILS # BLD AUTO: 0 K/UL — SIGNIFICANT CHANGE UP (ref 0–0.2)
BASOPHILS # BLD AUTO: 0 K/UL — SIGNIFICANT CHANGE UP (ref 0–0.2)
BASOPHILS NFR BLD AUTO: 0 % — SIGNIFICANT CHANGE UP (ref 0–2)
BASOPHILS NFR BLD AUTO: 0 % — SIGNIFICANT CHANGE UP (ref 0–2)
BILIRUB SERPL-MCNC: 0.3 MG/DL — SIGNIFICANT CHANGE UP (ref 0.2–1.2)
BILIRUB UR-MCNC: NEGATIVE — SIGNIFICANT CHANGE UP
BUN SERPL-MCNC: 7 MG/DL — SIGNIFICANT CHANGE UP (ref 7–23)
CALCIUM SERPL-MCNC: 8.4 MG/DL — LOW (ref 8.5–10.1)
CHLORIDE SERPL-SCNC: 101 MMOL/L — SIGNIFICANT CHANGE UP (ref 96–108)
CO2 SERPL-SCNC: 27 MMOL/L — SIGNIFICANT CHANGE UP (ref 22–31)
COLOR SPEC: YELLOW — SIGNIFICANT CHANGE UP
COMMENT - URINE: SIGNIFICANT CHANGE UP
CREAT SERPL-MCNC: 0.72 MG/DL — SIGNIFICANT CHANGE UP (ref 0.5–1.3)
DIFF PNL FLD: ABNORMAL
EGFR: 115 ML/MIN/1.73M2 — SIGNIFICANT CHANGE UP
ELLIPTOCYTES BLD QL SMEAR: SLIGHT — SIGNIFICANT CHANGE UP
EOSINOPHIL # BLD AUTO: 0 K/UL — SIGNIFICANT CHANGE UP (ref 0–0.5)
EOSINOPHIL # BLD AUTO: 0.03 K/UL — SIGNIFICANT CHANGE UP (ref 0–0.5)
EOSINOPHIL NFR BLD AUTO: 0 % — SIGNIFICANT CHANGE UP (ref 0–6)
EOSINOPHIL NFR BLD AUTO: 1 % — SIGNIFICANT CHANGE UP (ref 0–6)
EPI CELLS # UR: ABNORMAL
GLUCOSE SERPL-MCNC: 90 MG/DL — SIGNIFICANT CHANGE UP (ref 70–99)
GLUCOSE UR QL: NEGATIVE MG/DL — SIGNIFICANT CHANGE UP
HCG SERPL-ACNC: <1 MIU/ML — SIGNIFICANT CHANGE UP
HCT VFR BLD CALC: 24.4 % — LOW (ref 34.5–45)
HCT VFR BLD CALC: 35 % — SIGNIFICANT CHANGE UP (ref 34.5–45)
HGB BLD-MCNC: 11.1 G/DL — LOW (ref 11.5–15.5)
HGB BLD-MCNC: 7.8 G/DL — LOW (ref 11.5–15.5)
HYPOCHROMIA BLD QL: SLIGHT — SIGNIFICANT CHANGE UP
KETONES UR-MCNC: ABNORMAL
LEUKOCYTE ESTERASE UR-ACNC: NEGATIVE — SIGNIFICANT CHANGE UP
LIDOCAIN IGE QN: 219 U/L — SIGNIFICANT CHANGE UP (ref 73–393)
LYMPHOCYTES # BLD AUTO: 1.29 K/UL — SIGNIFICANT CHANGE UP (ref 1–3.3)
LYMPHOCYTES # BLD AUTO: 1.71 K/UL — SIGNIFICANT CHANGE UP (ref 1–3.3)
LYMPHOCYTES # BLD AUTO: 40 % — SIGNIFICANT CHANGE UP (ref 13–44)
LYMPHOCYTES # BLD AUTO: 43 % — SIGNIFICANT CHANGE UP (ref 13–44)
MANUAL SMEAR VERIFICATION: SIGNIFICANT CHANGE UP
MANUAL SMEAR VERIFICATION: SIGNIFICANT CHANGE UP
MCHC RBC-ENTMCNC: 24.7 PG — LOW (ref 27–34)
MCHC RBC-ENTMCNC: 25.1 PG — LOW (ref 27–34)
MCHC RBC-ENTMCNC: 31.7 G/DL — LOW (ref 32–36)
MCHC RBC-ENTMCNC: 32 G/DL — SIGNIFICANT CHANGE UP (ref 32–36)
MCV RBC AUTO: 78 FL — LOW (ref 80–100)
MCV RBC AUTO: 78.5 FL — LOW (ref 80–100)
MICROCYTES BLD QL: SLIGHT — SIGNIFICANT CHANGE UP
MICROCYTES BLD QL: SLIGHT — SIGNIFICANT CHANGE UP
MONOCYTES # BLD AUTO: 0.21 K/UL — SIGNIFICANT CHANGE UP (ref 0–0.9)
MONOCYTES # BLD AUTO: 0.3 K/UL — SIGNIFICANT CHANGE UP (ref 0–0.9)
MONOCYTES NFR BLD AUTO: 7 % — SIGNIFICANT CHANGE UP (ref 2–14)
MONOCYTES NFR BLD AUTO: 7 % — SIGNIFICANT CHANGE UP (ref 2–14)
NEUTROPHILS # BLD AUTO: 1.38 K/UL — LOW (ref 1.8–7.4)
NEUTROPHILS # BLD AUTO: 2.27 K/UL — SIGNIFICANT CHANGE UP (ref 1.8–7.4)
NEUTROPHILS NFR BLD AUTO: 33 % — LOW (ref 43–77)
NEUTROPHILS NFR BLD AUTO: 42 % — LOW (ref 43–77)
NEUTS BAND # BLD: 11 % — HIGH (ref 0–8)
NEUTS BAND # BLD: 13 % — HIGH (ref 0–8)
NITRITE UR-MCNC: NEGATIVE — SIGNIFICANT CHANGE UP
NRBC # BLD: 0 /100 — SIGNIFICANT CHANGE UP (ref 0–0)
NRBC # BLD: 0 /100 — SIGNIFICANT CHANGE UP (ref 0–0)
NRBC # BLD: SIGNIFICANT CHANGE UP /100 WBCS (ref 0–0)
NRBC # BLD: SIGNIFICANT CHANGE UP /100 WBCS (ref 0–0)
OVALOCYTES BLD QL SMEAR: SLIGHT — SIGNIFICANT CHANGE UP
PH UR: 6 — SIGNIFICANT CHANGE UP (ref 5–8)
PLAT MORPH BLD: NORMAL — SIGNIFICANT CHANGE UP
PLAT MORPH BLD: NORMAL — SIGNIFICANT CHANGE UP
PLATELET # BLD AUTO: 286 K/UL — SIGNIFICANT CHANGE UP (ref 150–400)
PLATELET # BLD AUTO: 419 K/UL — HIGH (ref 150–400)
POTASSIUM SERPL-MCNC: 3.2 MMOL/L — LOW (ref 3.5–5.3)
POTASSIUM SERPL-SCNC: 3.2 MMOL/L — LOW (ref 3.5–5.3)
PROT SERPL-MCNC: 7.6 GM/DL — SIGNIFICANT CHANGE UP (ref 6–8.3)
PROT UR-MCNC: 30 MG/DL
RAPID RVP RESULT: SIGNIFICANT CHANGE UP
RBC # BLD: 3.11 M/UL — LOW (ref 3.8–5.2)
RBC # BLD: 4.49 M/UL — SIGNIFICANT CHANGE UP (ref 3.8–5.2)
RBC # FLD: 16.5 % — HIGH (ref 10.3–14.5)
RBC # FLD: 16.5 % — HIGH (ref 10.3–14.5)
RBC BLD AUTO: ABNORMAL
RBC BLD AUTO: ABNORMAL
RBC CASTS # UR COMP ASSIST: SIGNIFICANT CHANGE UP /HPF (ref 0–4)
SARS-COV-2 RNA SPEC QL NAA+PROBE: SIGNIFICANT CHANGE UP
SODIUM SERPL-SCNC: 133 MMOL/L — LOW (ref 135–145)
SP GR SPEC: 1.02 — SIGNIFICANT CHANGE UP (ref 1.01–1.02)
UROBILINOGEN FLD QL: NEGATIVE MG/DL — SIGNIFICANT CHANGE UP
VARIANT LYMPHS # BLD: 3 % — SIGNIFICANT CHANGE UP (ref 0–6)
WBC # BLD: 3.01 K/UL — LOW (ref 3.8–10.5)
WBC # BLD: 4.28 K/UL — SIGNIFICANT CHANGE UP (ref 3.8–10.5)
WBC # FLD AUTO: 3.01 K/UL — LOW (ref 3.8–10.5)
WBC # FLD AUTO: 4.28 K/UL — SIGNIFICANT CHANGE UP (ref 3.8–10.5)
WBC UR QL: SIGNIFICANT CHANGE UP

## 2023-07-26 PROCEDURE — 74177 CT ABD & PELVIS W/CONTRAST: CPT | Mod: 26,MA

## 2023-07-26 PROCEDURE — 71046 X-RAY EXAM CHEST 2 VIEWS: CPT | Mod: 26

## 2023-07-26 PROCEDURE — 99222 1ST HOSP IP/OBS MODERATE 55: CPT

## 2023-07-26 PROCEDURE — 99285 EMERGENCY DEPT VISIT HI MDM: CPT

## 2023-07-26 RX ORDER — CIPROFLOXACIN LACTATE 400MG/40ML
400 VIAL (ML) INTRAVENOUS EVERY 12 HOURS
Refills: 0 | Status: DISCONTINUED | OUTPATIENT
Start: 2023-07-27 | End: 2023-07-28

## 2023-07-26 RX ORDER — METOCLOPRAMIDE HCL 10 MG
10 TABLET ORAL ONCE
Refills: 0 | Status: COMPLETED | OUTPATIENT
Start: 2023-07-26 | End: 2023-07-26

## 2023-07-26 RX ORDER — SODIUM CHLORIDE 9 MG/ML
2000 INJECTION INTRAMUSCULAR; INTRAVENOUS; SUBCUTANEOUS ONCE
Refills: 0 | Status: COMPLETED | OUTPATIENT
Start: 2023-07-26 | End: 2023-07-26

## 2023-07-26 RX ORDER — SODIUM CHLORIDE 9 MG/ML
1000 INJECTION, SOLUTION INTRAVENOUS
Refills: 0 | Status: COMPLETED | OUTPATIENT
Start: 2023-07-26 | End: 2023-07-27

## 2023-07-26 RX ORDER — ACETAMINOPHEN 500 MG
650 TABLET ORAL EVERY 6 HOURS
Refills: 0 | Status: DISCONTINUED | OUTPATIENT
Start: 2023-07-26 | End: 2023-07-31

## 2023-07-26 RX ORDER — CIPROFLOXACIN LACTATE 400MG/40ML
400 VIAL (ML) INTRAVENOUS ONCE
Refills: 0 | Status: COMPLETED | OUTPATIENT
Start: 2023-07-26 | End: 2023-07-26

## 2023-07-26 RX ORDER — METRONIDAZOLE 500 MG
500 TABLET ORAL EVERY 8 HOURS
Refills: 0 | Status: DISCONTINUED | OUTPATIENT
Start: 2023-07-26 | End: 2023-07-28

## 2023-07-26 RX ORDER — METRONIDAZOLE 500 MG
500 TABLET ORAL ONCE
Refills: 0 | Status: COMPLETED | OUTPATIENT
Start: 2023-07-26 | End: 2023-07-26

## 2023-07-26 RX ORDER — FAMOTIDINE 10 MG/ML
20 INJECTION INTRAVENOUS ONCE
Refills: 0 | Status: COMPLETED | OUTPATIENT
Start: 2023-07-26 | End: 2023-07-26

## 2023-07-26 RX ORDER — KETOROLAC TROMETHAMINE 30 MG/ML
15 SYRINGE (ML) INJECTION ONCE
Refills: 0 | Status: DISCONTINUED | OUTPATIENT
Start: 2023-07-26 | End: 2023-07-26

## 2023-07-26 RX ORDER — POTASSIUM CHLORIDE 20 MEQ
40 PACKET (EA) ORAL ONCE
Refills: 0 | Status: COMPLETED | OUTPATIENT
Start: 2023-07-26 | End: 2023-07-26

## 2023-07-26 RX ADMIN — SODIUM CHLORIDE 2000 MILLILITER(S): 9 INJECTION INTRAMUSCULAR; INTRAVENOUS; SUBCUTANEOUS at 13:16

## 2023-07-26 RX ADMIN — Medication 40 MILLIEQUIVALENT(S): at 15:29

## 2023-07-26 RX ADMIN — Medication 200 MILLIGRAM(S): at 20:12

## 2023-07-26 RX ADMIN — Medication 15 MILLIGRAM(S): at 16:00

## 2023-07-26 RX ADMIN — Medication 100 MILLIGRAM(S): at 19:38

## 2023-07-26 RX ADMIN — Medication 15 MILLIGRAM(S): at 15:30

## 2023-07-26 RX ADMIN — FAMOTIDINE 20 MILLIGRAM(S): 10 INJECTION INTRAVENOUS at 13:17

## 2023-07-26 RX ADMIN — Medication 10 MILLIGRAM(S): at 13:17

## 2023-07-26 NOTE — ED PROVIDER NOTE - CARE PLAN
1 Principal Discharge DX:	Pancolitis  Secondary Diagnosis:	Bandemia  Secondary Diagnosis:	Ovarian teratoma

## 2023-07-26 NOTE — H&P ADULT - NSHPLABSRESULTS_GEN_ALL_CORE
Recent Vitals  T(C): 37.1 (23 @ 21:20), Max: 39 (23 @ 12:08)  HR: 94 (23 @ 21:20) (94 - 106)  BP: 95/59 (23 @ 21:20) (95/59 - 102/68)  RR: 18 (23 @ 21:20) (18 - 18)  SpO2: 98% (23 @ 21:20) (98% - 98%)                        7.8    3.01  )-----------( 286      ( 2023 21:05 )             24.4         133<L>  |  101  |  7   ----------------------------<  90  3.2<L>   |  27  |  0.72    Ca    8.4<L>      2023 12:50    TPro  7.6  /  Alb  3.2<L>  /  TBili  0.3  /  DBili  x   /  AST  23  /  ALT  23  /  AlkPhos  63  07-26      LIVER FUNCTIONS - ( 2023 12:50 )  Alb: 3.2 g/dL / Pro: 7.6 gm/dL / ALK PHOS: 63 U/L / ALT: 23 U/L / AST: 23 U/L / GGT: x           Urinalysis Basic - ( 2023 15:30 )    Color: Yellow / Appearance: Slightly Turbid / S.020 / pH: x  Gluc: x / Ketone: Small  / Bili: Negative / Urobili: Negative mg/dL   Blood: x / Protein: 30 mg/dL / Nitrite: Negative   Leuk Esterase: Negative / RBC: 0-2 /HPF / WBC 3-5   Sq Epi: x / Non Sq Epi: x / Bacteria: Moderate        Home Medications:

## 2023-07-26 NOTE — ED PROVIDER NOTE - PHYSICAL EXAMINATION
Fong:  General: No distress.  Mentation at baseline.   HEENT: WNL  Chest/Lungs: CTAB, No wheeze, No retractions, No increased work of breathing, Normal rate  Heart: S1S2 RRR, No M/R/G, Pules equal Bilaterally in upper and lower extremities distally  Abd: soft, NT/ND, No guarding, No rebound.  No hernias, no palpable masses.  Extrem: FROM in all joints, no significant edema noted, No ulcers.  Cap refil < 2sec.  Skin: No rash noted, warm dry.  Neuro:  Grossly normal.  No difficulty ambulating. No focal deficits.  Psychiatric: No evidence of delusions. No SI/HI.

## 2023-07-26 NOTE — ED ADULT NURSE NOTE - CHIEF COMPLAINT QUOTE
Diarrhea since 7/14/23 after eating a fish meal, headaches x 1 1/2 weeks,  feeling very dizzy, blurry vision and feeling faint this morning.  LMP 7/16/23

## 2023-07-26 NOTE — ED ADULT NURSE NOTE - NSFALLUNIVINTERV_ED_ALL_ED
Bed/Stretcher in lowest position, wheels locked, appropriate side rails in place/Call bell, personal items and telephone in reach/Instruct patient to call for assistance before getting out of bed/chair/stretcher/Non-slip footwear applied when patient is off stretcher/[place] to call system/Physically safe environment - no spills, clutter or unnecessary equipment/Purposeful proactive rounding/Room/bathroom lighting operational, light cord in reach

## 2023-07-26 NOTE — ED ADULT NURSE NOTE - OBJECTIVE STATEMENT
A&OX4. Patient C/O  RUQ abdominal pain /fevers /nausea /Vomiting since July 14 after seafood meal. HA/Dizziness  patient states feeling like "going to faint" this morning .  patient denies SOB/CP/blood in stool / urine No pmh .  LMP 7/16/23

## 2023-07-26 NOTE — H&P ADULT - NSHPREVIEWOFSYSTEMS_GEN_ALL_CORE
Constitutional: fever, chills positive.    Ears: no hearing changes or ear pain,   Nose: no nasal congestion, sinus pain, or rhinorrhea  Cardio: no chest pain, orthopnea, edema, or palpitations  Resp: no dyspnea, cough, wheezing  GI: diarrhea positive.  No nausea, vomiting, hematochezia, or melena  : no dysuria, urinary frequency, hematuria  MSK: no back pain, neck pain  Skin: no rash, pruritis   Neuro: weakness positive.  No dizziness, lightheadedness, syncope   Heme/Lymph: no bruising or bleeding

## 2023-07-26 NOTE — ED PROVIDER NOTE - INTERNATIONAL TRAVEL
Called and requested refill(s):Alpa  Medications: Spiriva   Amount: 30 day supply  Pharmacy to be sent to: [facility] on [address]  Call back number: [phone]  Okay to leave detailed voice message: yes    
Refill request received for   spiriva  Last office visit: 4/7/2021  Next office visit: 10/5/2021  Last refill: 4/7/2020  Last labs: 4/12/2021      
No

## 2023-07-26 NOTE — ED PROVIDER NOTE - OBJECTIVE STATEMENT
32-year-old female without PMH presents with moderate diffuse abdominal pain and 6 episodes of nonbloody diarrhea daily x 12 days in the setting of eating questionable seafood.   +associated with lightheadedness.   +fever x 4-5 days, tmax 103F.   +nausea, no vomiting. no hx abdominal surgeries.  no cp, sob, back pain, urinary sxs, vaginal sxs.

## 2023-07-26 NOTE — H&P ADULT - HISTORY OF PRESENT ILLNESS
Patient is a 32F with no reported PMH who presents to the ED for diarrhea.  Patient states that twelve days ago she ate a seafood meal along with "unpastuerized juice" and the vey next day she started to have watery diarrhea.  Patient states that she has been having upto 6 episodes of diarrhea everyday since that meal.  Diarrhea assoicated with lower abdominal pains.  4 days after the meal she developed chills and tension like headache - took her temperature and it was 103.  Patient states that throughout this time she has had reduced PO intake but denies history of nausea and vomiting.  Patient denies bloody stools or rice specks in stool.  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  Febrile to 102.2 in ED, labs show hypokalemia.  Will admit to med surg.

## 2023-07-26 NOTE — H&P ADULT - ASSESSMENT
Patient is a 32F with no reported PMH who presents to the ED for diarrhea.  Patient states that twelve days ago she ate a seafood meal along with "unpastuerized juice" and the vey next day she started to have watery diarrhea.  Patient states that she has been having upto 6 episodes of diarrhea everyday since that meal.  Diarrhea assoicated with lower abdominal pains.  4 days after the meal she developed chills and tension like headache - took her temperature and it was 103.  Patient states that throughout this time she has had reduced PO intake but denies history of nausea and vomiting.  Patient denies bloody stools or rice specks in stool.  Of note, patient lives in [place] - is in [place] because she is the HCP for her father who is in the MICU at [facility].  Febrile to 102.2 in ED, labs show hypokalemia.  Will admit to med surg.

## 2023-07-26 NOTE — ED PROVIDER NOTE - NS ED ATTENDING STATEMENT MOD
This was a shared visit with the SIMBA. I reviewed and verified the documentation and independently performed the documented:

## 2023-07-27 LAB
ANION GAP SERPL CALC-SCNC: 4 MMOL/L — LOW (ref 5–17)
BUN SERPL-MCNC: 7 MG/DL — SIGNIFICANT CHANGE UP (ref 7–23)
CALCIUM SERPL-MCNC: 8.2 MG/DL — LOW (ref 8.5–10.1)
CHLORIDE SERPL-SCNC: 111 MMOL/L — HIGH (ref 96–108)
CO2 SERPL-SCNC: 23 MMOL/L — SIGNIFICANT CHANGE UP (ref 22–31)
CREAT SERPL-MCNC: 0.52 MG/DL — SIGNIFICANT CHANGE UP (ref 0.5–1.3)
EGFR: 127 ML/MIN/1.73M2 — SIGNIFICANT CHANGE UP
GLUCOSE SERPL-MCNC: 89 MG/DL — SIGNIFICANT CHANGE UP (ref 70–99)
HCT VFR BLD CALC: 29.2 % — LOW (ref 34.5–45)
HGB BLD-MCNC: 9.2 G/DL — LOW (ref 11.5–15.5)
MCHC RBC-ENTMCNC: 25.1 PG — LOW (ref 27–34)
MCHC RBC-ENTMCNC: 31.5 G/DL — LOW (ref 32–36)
MCV RBC AUTO: 79.8 FL — LOW (ref 80–100)
NRBC # BLD: 0 /100 WBCS — SIGNIFICANT CHANGE UP (ref 0–0)
PLATELET # BLD AUTO: 144 K/UL — LOW (ref 150–400)
POTASSIUM SERPL-MCNC: 3.7 MMOL/L — SIGNIFICANT CHANGE UP (ref 3.5–5.3)
POTASSIUM SERPL-SCNC: 3.7 MMOL/L — SIGNIFICANT CHANGE UP (ref 3.5–5.3)
RBC # BLD: 3.66 M/UL — LOW (ref 3.8–5.2)
RBC # FLD: 16.7 % — HIGH (ref 10.3–14.5)
SODIUM SERPL-SCNC: 138 MMOL/L — SIGNIFICANT CHANGE UP (ref 135–145)
WBC # BLD: 2.97 K/UL — LOW (ref 3.8–10.5)
WBC # FLD AUTO: 2.97 K/UL — LOW (ref 3.8–10.5)

## 2023-07-27 PROCEDURE — 99233 SBSQ HOSP IP/OBS HIGH 50: CPT

## 2023-07-27 RX ORDER — ONDANSETRON 8 MG/1
4 TABLET, FILM COATED ORAL EVERY 8 HOURS
Refills: 0 | Status: DISCONTINUED | OUTPATIENT
Start: 2023-07-27 | End: 2023-07-31

## 2023-07-27 RX ORDER — LANOLIN ALCOHOL/MO/W.PET/CERES
3 CREAM (GRAM) TOPICAL AT BEDTIME
Refills: 0 | Status: DISCONTINUED | OUTPATIENT
Start: 2023-07-27 | End: 2023-07-31

## 2023-07-27 RX ADMIN — Medication 30 MILLILITER(S): at 09:24

## 2023-07-27 RX ADMIN — Medication 650 MILLIGRAM(S): at 00:32

## 2023-07-27 RX ADMIN — SODIUM CHLORIDE 150 MILLILITER(S): 9 INJECTION, SOLUTION INTRAVENOUS at 11:10

## 2023-07-27 RX ADMIN — Medication 100 MILLIGRAM(S): at 05:12

## 2023-07-27 RX ADMIN — Medication 650 MILLIGRAM(S): at 13:41

## 2023-07-27 RX ADMIN — Medication 100 MILLIGRAM(S): at 13:30

## 2023-07-27 RX ADMIN — Medication 100 MILLIGRAM(S): at 20:54

## 2023-07-27 RX ADMIN — Medication 200 MILLIGRAM(S): at 08:34

## 2023-07-27 RX ADMIN — Medication 650 MILLIGRAM(S): at 12:41

## 2023-07-27 RX ADMIN — SODIUM CHLORIDE 150 MILLILITER(S): 9 INJECTION, SOLUTION INTRAVENOUS at 00:35

## 2023-07-27 RX ADMIN — Medication 650 MILLIGRAM(S): at 20:30

## 2023-07-27 RX ADMIN — Medication 200 MILLIGRAM(S): at 20:54

## 2023-07-27 NOTE — PROGRESS NOTE ADULT - ASSESSMENT
Patient is a 32F with no reported PMH who presents to the ED for diarrhea.  Patient states that twelve days ago she ate a seafood meal along with "unpastuerized juice" and the vey next day she started to have watery diarrhea.  Patient states that she has been having upto 6 episodes of diarrhea everyday since that meal.  Diarrhea assoicated with lower abdominal pains.  4 days after the meal she developed chills and tension like headache - took her temperature and it was 103.  Patient states that throughout this time she has had reduced PO intake but denies history of nausea and vomiting.  Patient denies bloody stools or rice specks in stool.  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.  Febrile to 102.2 in ED, labs show hypokalemia.  Will admit to med surg.

## 2023-07-27 NOTE — PATIENT PROFILE ADULT - FALL HARM RISK - UNIVERSAL INTERVENTIONS
Bed in lowest position, wheels locked, appropriate side rails in place/Call bell, personal items and telephone in reach/Instruct patient to call for assistance before getting out of bed or chair/Non-slip footwear when patient is out of bed/[place] to call system/Physically safe environment - no spills, clutter or unnecessary equipment/Purposeful Proactive Rounding/Room/bathroom lighting operational, light cord in reach

## 2023-07-28 LAB
ANION GAP SERPL CALC-SCNC: 5 MMOL/L — SIGNIFICANT CHANGE UP (ref 5–17)
BUN SERPL-MCNC: 5 MG/DL — LOW (ref 7–23)
C DIFF BY PCR RESULT: SIGNIFICANT CHANGE UP
CALCIUM SERPL-MCNC: 8.3 MG/DL — LOW (ref 8.5–10.1)
CHLORIDE SERPL-SCNC: 108 MMOL/L — SIGNIFICANT CHANGE UP (ref 96–108)
CO2 SERPL-SCNC: 27 MMOL/L — SIGNIFICANT CHANGE UP (ref 22–31)
CREAT SERPL-MCNC: 0.52 MG/DL — SIGNIFICANT CHANGE UP (ref 0.5–1.3)
CULTURE RESULTS: SIGNIFICANT CHANGE UP
EGFR: 127 ML/MIN/1.73M2 — SIGNIFICANT CHANGE UP
GLUCOSE SERPL-MCNC: 90 MG/DL — SIGNIFICANT CHANGE UP (ref 70–99)
GRAM STN FLD: SIGNIFICANT CHANGE UP
GRAM STN FLD: SIGNIFICANT CHANGE UP
HCT VFR BLD CALC: 27.6 % — LOW (ref 34.5–45)
HGB BLD-MCNC: 8.8 G/DL — LOW (ref 11.5–15.5)
MAGNESIUM SERPL-MCNC: 2 MG/DL — SIGNIFICANT CHANGE UP (ref 1.6–2.6)
MCHC RBC-ENTMCNC: 24.4 PG — LOW (ref 27–34)
MCHC RBC-ENTMCNC: 31.9 G/DL — LOW (ref 32–36)
MCV RBC AUTO: 76.5 FL — LOW (ref 80–100)
METHOD TYPE: SIGNIFICANT CHANGE UP
NRBC # BLD: 0 /100 WBCS — SIGNIFICANT CHANGE UP (ref 0–0)
PLATELET # BLD AUTO: 313 K/UL — SIGNIFICANT CHANGE UP (ref 150–400)
POTASSIUM SERPL-MCNC: 3.2 MMOL/L — LOW (ref 3.5–5.3)
POTASSIUM SERPL-SCNC: 3.2 MMOL/L — LOW (ref 3.5–5.3)
RAPID RVP RESULT: SIGNIFICANT CHANGE UP
RBC # BLD: 3.61 M/UL — LOW (ref 3.8–5.2)
RBC # FLD: 16.7 % — HIGH (ref 10.3–14.5)
SALMONELLA DNA BLD POS QL NAA+NON-PROBE: SIGNIFICANT CHANGE UP
SARS-COV-2 RNA SPEC QL NAA+PROBE: SIGNIFICANT CHANGE UP
SODIUM SERPL-SCNC: 140 MMOL/L — SIGNIFICANT CHANGE UP (ref 135–145)
SPECIMEN SOURCE: SIGNIFICANT CHANGE UP
WBC # BLD: 4.21 K/UL — SIGNIFICANT CHANGE UP (ref 3.8–10.5)
WBC # FLD AUTO: 4.21 K/UL — SIGNIFICANT CHANGE UP (ref 3.8–10.5)

## 2023-07-28 PROCEDURE — 99232 SBSQ HOSP IP/OBS MODERATE 35: CPT

## 2023-07-28 PROCEDURE — 99254 IP/OBS CNSLTJ NEW/EST MOD 60: CPT

## 2023-07-28 RX ORDER — POTASSIUM CHLORIDE 20 MEQ
40 PACKET (EA) ORAL EVERY 4 HOURS
Refills: 0 | Status: COMPLETED | OUTPATIENT
Start: 2023-07-28 | End: 2023-07-28

## 2023-07-28 RX ORDER — OXYCODONE AND ACETAMINOPHEN 5; 325 MG/1; MG/1
2 TABLET ORAL EVERY 4 HOURS
Refills: 0 | Status: DISCONTINUED | OUTPATIENT
Start: 2023-07-28 | End: 2023-07-31

## 2023-07-28 RX ORDER — OXYCODONE AND ACETAMINOPHEN 5; 325 MG/1; MG/1
1 TABLET ORAL EVERY 4 HOURS
Refills: 0 | Status: DISCONTINUED | OUTPATIENT
Start: 2023-07-28 | End: 2023-07-31

## 2023-07-28 RX ORDER — POTASSIUM CHLORIDE 20 MEQ
10 PACKET (EA) ORAL
Refills: 0 | Status: DISCONTINUED | OUTPATIENT
Start: 2023-07-28 | End: 2023-07-28

## 2023-07-28 RX ORDER — KETOROLAC TROMETHAMINE 30 MG/ML
30 SYRINGE (ML) INJECTION ONCE
Refills: 0 | Status: DISCONTINUED | OUTPATIENT
Start: 2023-07-28 | End: 2023-07-28

## 2023-07-28 RX ORDER — CEFTRIAXONE 500 MG/1
2000 INJECTION, POWDER, FOR SOLUTION INTRAMUSCULAR; INTRAVENOUS ONCE
Refills: 0 | Status: COMPLETED | OUTPATIENT
Start: 2023-07-28 | End: 2023-07-28

## 2023-07-28 RX ORDER — CEFTRIAXONE 500 MG/1
2000 INJECTION, POWDER, FOR SOLUTION INTRAMUSCULAR; INTRAVENOUS EVERY 24 HOURS
Refills: 0 | Status: DISCONTINUED | OUTPATIENT
Start: 2023-07-29 | End: 2023-07-31

## 2023-07-28 RX ORDER — CEFTRIAXONE 500 MG/1
INJECTION, POWDER, FOR SOLUTION INTRAMUSCULAR; INTRAVENOUS
Refills: 0 | Status: DISCONTINUED | OUTPATIENT
Start: 2023-07-28 | End: 2023-07-31

## 2023-07-28 RX ADMIN — ONDANSETRON 4 MILLIGRAM(S): 8 TABLET, FILM COATED ORAL at 05:34

## 2023-07-28 RX ADMIN — Medication 100 MILLIEQUIVALENT(S): at 14:46

## 2023-07-28 RX ADMIN — Medication 650 MILLIGRAM(S): at 13:04

## 2023-07-28 RX ADMIN — Medication 40 MILLIEQUIVALENT(S): at 14:46

## 2023-07-28 RX ADMIN — Medication 100 MILLIEQUIVALENT(S): at 16:19

## 2023-07-28 RX ADMIN — CEFTRIAXONE 100 MILLIGRAM(S): 500 INJECTION, POWDER, FOR SOLUTION INTRAMUSCULAR; INTRAVENOUS at 14:41

## 2023-07-28 RX ADMIN — Medication 200 MILLIGRAM(S): at 09:04

## 2023-07-28 RX ADMIN — Medication 100 MILLIGRAM(S): at 05:23

## 2023-07-28 RX ADMIN — Medication 40 MILLIEQUIVALENT(S): at 17:51

## 2023-07-28 RX ADMIN — Medication 30 MILLIGRAM(S): at 13:52

## 2023-07-28 RX ADMIN — Medication 30 MILLILITER(S): at 17:02

## 2023-07-28 RX ADMIN — Medication 30 MILLIGRAM(S): at 16:25

## 2023-07-28 RX ADMIN — Medication 650 MILLIGRAM(S): at 12:04

## 2023-07-28 RX ADMIN — Medication 30 MILLILITER(S): at 05:33

## 2023-07-28 NOTE — PROVIDER CONTACT NOTE (CRITICAL VALUE NOTIFICATION) - PERSON GIVING RESULT:
07/30/18 1455   Discharge Assessment   Assessment Type Discharge Planning Assessment   Confirmed/corrected address and phone number on facesheet? Yes   Assessment information obtained from? Patient   Expected Length of Stay (days) 2   Communicated expected length of stay with patient/caregiver yes   Prior to hospitilization cognitive status: Alert/Oriented   Prior to hospitalization functional status: Independent   Current cognitive status: Alert/Oriented   Current Functional Status: Independent   Lives With spouse  (spouse, Destiny Deleon ([phone]))   Able to Return to Prior Arrangements yes   Is patient able to care for self after discharge? Yes   Patient's perception of discharge disposition home or selfcare   Readmission Within The Last 30 Days no previous admission in last 30 days   Patient currently being followed by outpatient case management? No   Patient currently receives any other outside agency services? No   Equipment Currently Used at Home glucometer;other (see comments)  (BP machine)   Do you have any problems affording any of your prescribed medications? No   Is the patient taking medications as prescribed? yes   Does the patient have transportation home? Yes   Transportation Available car   Does the patient receive services at the [facility]? No   Discharge Plan A Home with family   Discharge Plan B Home Health   Patient/Family In Agreement With Plan yes     Dx: chest pain  Consult: ghassan  Pharm: [facility]  Hosp f/u appt: Dr. Antony Erazo (cards) on 8/10/18 at 1000 & Dr. Bree Larose (PCP) on 8/16/18 at 1000   Debbie

## 2023-07-28 NOTE — CONSULT NOTE ADULT - SUBJECTIVE AND OBJECTIVE BOX
[facility]  INFECTIOUS DISEASES   [address] [place] [address]  Tel: [phone]     Fax: [phone]  ======================================================  Tinajero MD Jonathan Garellek, DO Roxy Leonard, CHICHI   ======================================================    CURTIS AUGUSTINE  MRN-[id number]  Female  32y ([birth year])        Patient is a 32y old  Female who presents with a chief complaint of colitis, r/o infectious diarrhea (28 Jul 2023 13:27)      HPI:  Patient is a 32F with no reported PMH who presents to the ED for diarrhea.  Patient states that twelve days ago she ate a seafood meal along with "unpastuerized juice" and the vey next day she started to have watery diarrhea.  Patient states that she has been having upto 6 episodes of diarrhea everyday since that meal.  Diarrhea assoicated with lower abdominal pains.  4 days after the meal she developed chills and tension like headache - took her temperature and it was 103.  Patient states that throughout this time she has had reduced PO intake but denies history of nausea and vomiting.  Patient denies bloody stools or rice specks in stool.  Of note, patient lives in [place] - is in [place] because she is the HCP for her father who is in the MICU at [facility].  Febrile to 102.2 in ED, labs show hypokalemia.  Will admit to med surg.   (26 Jul 2023 23:10)      ID consulted for workup and antibiotic management     PAST MEDICAL & SURGICAL HISTORY:  No pertinent past medical history      No significant past surgical history          Allergies  No Known Allergies        ANTIMICROBIALS:  cefTRIAXone   IVPB        MEDICATIONS  (STANDING):  cefTRIAXone   IVPB   100 mL/Hr IV Intermittent (07-28-23 @ 14:41)    ciprofloxacin   IVPB   200 mL/Hr IV Intermittent (07-28-23 @ 09:04)   200 mL/Hr IV Intermittent (07-27-23 @ 20:54)   200 mL/Hr IV Intermittent (07-27-23 @ 08:34)    ciprofloxacin   IVPB   200 mL/Hr IV Intermittent (07-26-23 @ 20:12)    metroNIDAZOLE  IVPB   100 mL/Hr IV Intermittent (07-28-23 @ 05:23)   100 mL/Hr IV Intermittent (07-27-23 @ 20:54)   100 mL/Hr IV Intermittent (07-27-23 @ 13:30)   100 mL/Hr IV Intermittent (07-27-23 @ 05:12)    metroNIDAZOLE  IVPB   100 mL/Hr IV Intermittent (07-26-23 @ 19:38)        OTHER MEDS: MEDICATIONS  (STANDING):  acetaminophen     Tablet .. 650 every 6 hours PRN  aluminum hydroxide/magnesium hydroxide/simethicone Suspension 30 every 4 hours PRN  melatonin 3 at bedtime PRN  ondansetron Injectable 4 every 8 hours PRN  oxycodone    5 mG/acetaminophen 325 mG 1 every 4 hours PRN  oxycodone    5 mG/acetaminophen 325 mG 2 every 4 hours PRN      SOCIAL HISTORY:     Smoking Cigarettes [ ]Active [ ] Former [x ]Denies   ETOH [ ]denies [ ]Former [x ]occasional   Drug Use []denies [ ] Former [ ] Active     FAMILY HISTORY:  FH: HTN (hypertension)        REVIEW OF SYSTEMS  [  ] ROS unobtainable because:    [ X] All other systems negative except as noted below:	    Constitutional:  [x ] fever [x ] chills  [ ] weight loss  [ ] weakness  Skin:  [ ] rash [ ] phlebitis	  Eyes: [ ] icterus [ ] pain  [ ] discharge	  ENMT: [ ] sore throat  [ ] thrush [ ] ulcers [ ] exudates  Respiratory: [ ] dyspnea [ ] hemoptysis [ ] cough [ ] sputum	  Cardiovascular:  [ ] chest pain [ ] palpitations [ ] edema	  Gastrointestinal:  [x ] nausea [ ] vomiting [x ] diarrhea [ ] constipation [ ] pain	  Genitourinary:  [ ] dysuria [ ] frequency [ ] hematuria [ ] discharge [ ] flank pain  [ ] incontinence  Musculoskeletal:  [ ] myalgias [ ] arthralgias [ ] arthritis  [ ] back pain  Neurological:  [ ] headache [ ] seizures  [ ] confusion/altered mental status  Psychiatric:  [ ] anxiety [ ] depression	  Hematology/Lymphatics:  [ ] lymphadenopathy  Endocrine:  [ ] adrenal [ ] thyroid  Allergic/Immunologic:	 [ ] transplant [ ] seasonal    Vital Signs Last 24 Hrs  T(F): 102.9 (07-28-23 @ 12:24), Max: 102.9 (07-28-23 @ 12:24)    Vital Signs Last 24 Hrs  HR: 81 (07-28-23 @ 12:24) (78 - 97)  BP: 105/72 (07-28-23 @ 12:24) (90/58 - 105/72)  RR: 17 (07-28-23 @ 12:24)  SpO2: 98% (07-28-23 @ 12:24) (97% - 100%)  Wt(kg): --    PHYSICAL EXAM:  Constitutional: non-toxic, no distress  HEAD/EYES: anicteric, no conjunctival injection  ENT:  supple, no thrush  Cardiovascular:   normal S1, S2, no murmur, no edema  Respiratory:  clear BS bilaterally, no wheezes, no rales  GI:  soft, non-tender, normal bowel sounds  :  no kim, no CVA tenderness  Musculoskeletal:  no synovitis, normal ROM  Neurologic: awake and alert, normal strength, no focal findings  Skin:  no rash, no erythema, no phlebitis  Heme/Onc: no lymphadenopathy   Psychiatric:  awake, alert, appropriate mood    WBC Count: 4.21 K/uL (07-28 @ 07:13)  WBC Count: 2.97 K/uL (07-27 @ 08:10)  WBC Count: 3.01 K/uL (07-26 @ 21:05)  WBC Count: 4.28 K/uL (07-26 @ 12:50)      Auto Neutrophil %: 33.0 % *L* (07-26-23 @ 21:05)  Auto Neutrophil #: 1.38 K/uL *L* (07-26-23 @ 21:05)  Auto Neutrophil %: 42.0 % *L* (07-26-23 @ 12:50)  Auto Neutrophil #: 2.27 K/uL (07-26-23 @ 12:50)                            8.8    4.21  )-----------( 313      ( 28 Jul 2023 07:13 )             27.6       07-28    140  |  108  |  5<L>  ----------------------------<  90  3.2<L>   |  27  |  0.52    Ca    8.3<L>      28 Jul 2023 07:13  Mg     2.0     07-28        Creatinine Trend: 0.52<--, 0.52<--, 0.72<--      Lipase: 219 U/L (07-26-23 @ 12:50)          MICROBIOLOGY:  .Blood Blood-Peripheral  07-27-23   Growth in anaerobic bottle: Gram Negative Rods  --  Blood Culture PCR        SARS-CoV-2: NotDetec (27 Jul 2023 23:10)  SARS-CoV-2: NotDetec (26 Jul 2023 12:50)        Rapid RVP Result: NotDetec (07-27 @ 23:10)  C Diff by PCR Result: NotDetec (07-27 @ 16:00)  Rapid RVP Result: NotDetec (07-26 @ 12:50)      C Diff by PCR Result: NotDetec (07-27-23 @ 16:00)      Rapid RVP Result: NotDetec (07-27-23 @ 23:10)  SARS-CoV-2: NotDetec (07-27-23 @ 23:10)  SARS-CoV-2: NotDetec (07-26-23 @ 12:50)  Rapid RVP Result: NotDetec (07-26-23 @ 12:50)        RADIOLOGY:  ACC: [id number] EXAM:  XR CHEST PA LAT 2V   ORDERED BY: MARBIN IBARRA     PROCEDURE DATE:  07/26/2023          INTERPRETATION:  Clinical history: 32-year-old female, epigastric pain.    Two views of the chest are compared to 6/25/2019.    FINDINGS: Normalcardiac silhouette and normal pulmonary vasculature with   no consolidation, effusion, pneumothorax or acute osseous finding.    IMPRESSION:  Negative radiographs, unchanged      JOVANNI PEÑA DO; Attending Radiologist  This document has been electronically signed. Jul 26 2023  2:52PM    I have personally reviewed the above imaging

## 2023-07-28 NOTE — PROGRESS NOTE ADULT - ASSESSMENT
Patient is a 32F with no reported PMH who presents to the ED for diarrhea.  Patient states that twelve days ago she ate a seafood meal along with "unpastuerized juice" and the vey next day she started to have watery diarrhea.  Patient states that she has been having upto 6 episodes of diarrhea everyday since that meal.  Diarrhea assoicated with lower abdominal pains.  4 days after the meal she developed chills and tension like headache - took her temperature and it was 103.  Patient states that throughout this time she has had reduced PO intake but denies history of nausea and vomiting.  Patient denies bloody stools or rice specks in stool.  Of note, patient lives in [place] - is in [place] because she is the HCP for her father who is in the MICU at [facility].  Febrile to 102.2 in ED, labs show hypokalemia.  Will admit to med surg.

## 2023-07-28 NOTE — CONSULT NOTE ADULT - ASSESSMENT
32F with no reported PMH who presents to the ED for diarrhea.  Patient states that twelve days ago she ate a seafood meal along with "unpastuerized juice" and the vey next day she started to have watery diarrhea.  Patient states that she has been having upto 6 episodes of diarrhea everyday since that meal.  Diarrhea assoicated with lower abdominal pains.  4 days after the meal she developed chills and tension like headache - took her temperature and it was 103.  Patient states that throughout this time she has had reduced PO intake but denies history of nausea and vomiting.  Patient denies bloody stools or rice specks in stool.  Of note, patient lives in [place] - is in [place] because she is the HCP for her father who is in the MICU at [facility].    Febrile to 102.2 in ED, labs show hypokalemia  CT shows pancolitis   Blood cultures positive for salmonella   likely food related   fever can last several days   need to document clearance from blood stream     Plan:  stop cipro and flagyl   start ceftriaxone 2g q24hrs   follow blood cultures results  repeat blood cultures x2 sets  monitor stool counts  advance diet as tolerated   check electrolytes daily and replace     Discussed with Dr. Anthony Palacios, DO  Infectious Disease Attending  Reachable via Microsoft Teams or ID office: [phone]  After 5pm/weekends please call [phone] for all inquiries and new consults

## 2023-07-29 LAB
ANION GAP SERPL CALC-SCNC: 5 MMOL/L — SIGNIFICANT CHANGE UP (ref 5–17)
BUN SERPL-MCNC: 10 MG/DL — SIGNIFICANT CHANGE UP (ref 7–23)
CALCIUM SERPL-MCNC: 8.5 MG/DL — SIGNIFICANT CHANGE UP (ref 8.5–10.1)
CHLORIDE SERPL-SCNC: 108 MMOL/L — SIGNIFICANT CHANGE UP (ref 96–108)
CO2 SERPL-SCNC: 27 MMOL/L — SIGNIFICANT CHANGE UP (ref 22–31)
CREAT SERPL-MCNC: 0.57 MG/DL — SIGNIFICANT CHANGE UP (ref 0.5–1.3)
EGFR: 125 ML/MIN/1.73M2 — SIGNIFICANT CHANGE UP
FLUAV AG NPH QL: SIGNIFICANT CHANGE UP
FLUBV AG NPH QL: SIGNIFICANT CHANGE UP
GLUCOSE SERPL-MCNC: 82 MG/DL — SIGNIFICANT CHANGE UP (ref 70–99)
HCT VFR BLD CALC: 28.6 % — LOW (ref 34.5–45)
HGB BLD-MCNC: 9.3 G/DL — LOW (ref 11.5–15.5)
MAGNESIUM SERPL-MCNC: 2 MG/DL — SIGNIFICANT CHANGE UP (ref 1.6–2.6)
MCHC RBC-ENTMCNC: 24.9 PG — LOW (ref 27–34)
MCHC RBC-ENTMCNC: 32.5 G/DL — SIGNIFICANT CHANGE UP (ref 32–36)
MCV RBC AUTO: 76.7 FL — LOW (ref 80–100)
NRBC # BLD: 0 /100 WBCS — SIGNIFICANT CHANGE UP (ref 0–0)
PHOSPHATE SERPL-MCNC: 3.3 MG/DL — SIGNIFICANT CHANGE UP (ref 2.5–4.5)
PLATELET # BLD AUTO: 311 K/UL — SIGNIFICANT CHANGE UP (ref 150–400)
POTASSIUM SERPL-MCNC: 3.8 MMOL/L — SIGNIFICANT CHANGE UP (ref 3.5–5.3)
POTASSIUM SERPL-SCNC: 3.8 MMOL/L — SIGNIFICANT CHANGE UP (ref 3.5–5.3)
RBC # BLD: 3.73 M/UL — LOW (ref 3.8–5.2)
RBC # FLD: 16.9 % — HIGH (ref 10.3–14.5)
SARS-COV-2 RNA SPEC QL NAA+PROBE: SIGNIFICANT CHANGE UP
SODIUM SERPL-SCNC: 140 MMOL/L — SIGNIFICANT CHANGE UP (ref 135–145)
WBC # BLD: 3.88 K/UL — SIGNIFICANT CHANGE UP (ref 3.8–10.5)
WBC # FLD AUTO: 3.88 K/UL — SIGNIFICANT CHANGE UP (ref 3.8–10.5)

## 2023-07-29 PROCEDURE — 99232 SBSQ HOSP IP/OBS MODERATE 35: CPT

## 2023-07-29 RX ORDER — KETOROLAC TROMETHAMINE 30 MG/ML
30 SYRINGE (ML) INJECTION EVERY 24 HOURS
Refills: 0 | Status: DISCONTINUED | OUTPATIENT
Start: 2023-07-29 | End: 2023-07-31

## 2023-07-29 RX ORDER — DIPHENOXYLATE HCL/ATROPINE 2.5-.025MG
1 TABLET ORAL
Refills: 0 | Status: DISCONTINUED | OUTPATIENT
Start: 2023-07-29 | End: 2023-07-31

## 2023-07-29 RX ADMIN — CEFTRIAXONE 100 MILLIGRAM(S): 500 INJECTION, POWDER, FOR SOLUTION INTRAMUSCULAR; INTRAVENOUS at 13:14

## 2023-07-29 RX ADMIN — Medication 650 MILLIGRAM(S): at 15:54

## 2023-07-29 RX ADMIN — Medication 1 TABLET(S): at 11:07

## 2023-07-29 RX ADMIN — Medication 650 MILLIGRAM(S): at 16:30

## 2023-07-29 RX ADMIN — Medication 650 MILLIGRAM(S): at 03:20

## 2023-07-29 NOTE — PROGRESS NOTE ADULT - ASSESSMENT
Patient is a 32F with no reported PMH who presents to the ED for diarrhea.  Patient states that twelve days ago she ate a seafood meal along with "unpastuerized juice" and the vey next day she started to have watery diarrhea.  Patient states that she has been having upto 6 episodes of diarrhea everyday since that meal.  Diarrhea assoicated with lower abdominal pains.  4 days after the meal she developed chills and tension like headache - took her temperature and it was 103.  Patient states that throughout this time she has had reduced PO intake but denies history of nausea and vomiting.  Patient denies bloody stools or rice specks in stool.  Of note, patient lives in [place] - is in [place] because she is the HCP for her father who is in the MICU at [facility].  Febrile to 102.2 in ED, labs show hypokalemia.  Will admit to med surg.        Roomate had covid will retest

## 2023-07-30 DIAGNOSIS — R78.81 BACTEREMIA: ICD-10-CM

## 2023-07-30 LAB
-  AMPICILLIN: SIGNIFICANT CHANGE UP
-  CEFTRIAXONE: SIGNIFICANT CHANGE UP
-  CIPROFLOXACIN: SIGNIFICANT CHANGE UP
-  TRIMETHOPRIM/SULFAMETHOXAZOLE: SIGNIFICANT CHANGE UP
ALBUMIN SERPL ELPH-MCNC: 2.3 G/DL — LOW (ref 3.3–5)
ALP SERPL-CCNC: 62 U/L — SIGNIFICANT CHANGE UP (ref 40–120)
ALT FLD-CCNC: 27 U/L — SIGNIFICANT CHANGE UP (ref 12–78)
ANION GAP SERPL CALC-SCNC: 4 MMOL/L — LOW (ref 5–17)
AST SERPL-CCNC: 30 U/L — SIGNIFICANT CHANGE UP (ref 15–37)
BILIRUB SERPL-MCNC: 0.1 MG/DL — LOW (ref 0.2–1.2)
BUN SERPL-MCNC: 5 MG/DL — LOW (ref 7–23)
CALCIUM SERPL-MCNC: 8.5 MG/DL — SIGNIFICANT CHANGE UP (ref 8.5–10.1)
CHLORIDE SERPL-SCNC: 103 MMOL/L — SIGNIFICANT CHANGE UP (ref 96–108)
CO2 SERPL-SCNC: 29 MMOL/L — SIGNIFICANT CHANGE UP (ref 22–31)
CREAT SERPL-MCNC: 0.55 MG/DL — SIGNIFICANT CHANGE UP (ref 0.5–1.3)
CULTURE RESULTS: SIGNIFICANT CHANGE UP
CULTURE RESULTS: SIGNIFICANT CHANGE UP
EGFR: 126 ML/MIN/1.73M2 — SIGNIFICANT CHANGE UP
GI PCR PANEL: SIGNIFICANT CHANGE UP
GLUCOSE SERPL-MCNC: 80 MG/DL — SIGNIFICANT CHANGE UP (ref 70–99)
GRAM STN FLD: SIGNIFICANT CHANGE UP
GRAM STN FLD: SIGNIFICANT CHANGE UP
HCT VFR BLD CALC: 27.2 % — LOW (ref 34.5–45)
HGB BLD-MCNC: 8.9 G/DL — LOW (ref 11.5–15.5)
MAGNESIUM SERPL-MCNC: 1.9 MG/DL — SIGNIFICANT CHANGE UP (ref 1.6–2.6)
MCHC RBC-ENTMCNC: 25.1 PG — LOW (ref 27–34)
MCHC RBC-ENTMCNC: 32.7 G/DL — SIGNIFICANT CHANGE UP (ref 32–36)
MCV RBC AUTO: 76.8 FL — LOW (ref 80–100)
METHOD TYPE: SIGNIFICANT CHANGE UP
METHOD TYPE: SIGNIFICANT CHANGE UP
NRBC # BLD: 0 /100 WBCS — SIGNIFICANT CHANGE UP (ref 0–0)
ORGANISM # SPEC MICROSCOPIC CNT: SIGNIFICANT CHANGE UP
PHOSPHATE SERPL-MCNC: 3.7 MG/DL — SIGNIFICANT CHANGE UP (ref 2.5–4.5)
PLATELET # BLD AUTO: 349 K/UL — SIGNIFICANT CHANGE UP (ref 150–400)
POTASSIUM SERPL-MCNC: 3.8 MMOL/L — SIGNIFICANT CHANGE UP (ref 3.5–5.3)
POTASSIUM SERPL-SCNC: 3.8 MMOL/L — SIGNIFICANT CHANGE UP (ref 3.5–5.3)
PROT SERPL-MCNC: 6.3 GM/DL — SIGNIFICANT CHANGE UP (ref 6–8.3)
RBC # BLD: 3.54 M/UL — LOW (ref 3.8–5.2)
RBC # FLD: 17.3 % — HIGH (ref 10.3–14.5)
SODIUM SERPL-SCNC: 136 MMOL/L — SIGNIFICANT CHANGE UP (ref 135–145)
SPECIMEN SOURCE: SIGNIFICANT CHANGE UP
SPECIMEN SOURCE: SIGNIFICANT CHANGE UP
WBC # BLD: 4.78 K/UL — SIGNIFICANT CHANGE UP (ref 3.8–10.5)
WBC # FLD AUTO: 4.78 K/UL — SIGNIFICANT CHANGE UP (ref 3.8–10.5)

## 2023-07-30 PROCEDURE — 99232 SBSQ HOSP IP/OBS MODERATE 35: CPT

## 2023-07-30 RX ADMIN — CEFTRIAXONE 100 MILLIGRAM(S): 500 INJECTION, POWDER, FOR SOLUTION INTRAMUSCULAR; INTRAVENOUS at 13:30

## 2023-07-30 RX ADMIN — Medication 650 MILLIGRAM(S): at 18:15

## 2023-07-30 RX ADMIN — Medication 650 MILLIGRAM(S): at 17:39

## 2023-07-30 RX ADMIN — Medication 650 MILLIGRAM(S): at 02:11

## 2023-07-30 NOTE — PROGRESS NOTE ADULT - NSPROGADDITIONALINFOA_GEN_ALL_CORE
Pending GI PCR, C diff and stool cultures. F/u blood cultures and improving in diarrhea.
Pending GI PCR, C diff and stool cultures. F/u blood cultures and improving in diarrhea.     Amna Moncada MD  Division of Hospital Medicine  Available on Microsoft Teams
Pending GI PCR, C diff and stool cultures. F/u blood cultures and improving in diarrhea.
Pending GI PCR, C diff and stool cultures. F/u blood cultures and improving in diarrhea.

## 2023-07-30 NOTE — PROGRESS NOTE ADULT - PROBLEM SELECTOR PLAN 1
Blood cultures sent (fever >101F)  Continue cipro/flagyl given fevers  Tylenol 650 mg PRN pain/fever  -Send GI PCR, Cdiff and stool culture
present in inititial blood culture   patient was septic on admission   repeat culture negative     Stool PCR negative     will need to follow with ID for antibiotic final recommendations
Blood cultures sent (fever >101F)  cipeo flagyl  stopped ceftriaxone   Tylenol 650 mg PRN pain/fever  -Send GI PCR, Cdiff and stool culture
Blood cultures sent (fever >101F)  cipeo flagyl  stopped   ceftriaxone  2g   Tylenol 650 mg PRN pain/fever  -Send GI PCR  cdiff negative

## 2023-07-30 NOTE — PROGRESS NOTE ADULT - PROBLEM SELECTOR PLAN 2
Patient aware of teratoma  Follows with OBGYN
Patient aware of teratoma  Follows with OBGYN
Blood cultures sent (fever >101F)  cipeo flagyl  stopped   ceftriaxone  2g   Tylenol 650 mg PRN pain/fever   GI PCR negative   cdiff negative
Patient aware of teratoma  Follows with OBGYN

## 2023-07-30 NOTE — PROGRESS NOTE ADULT - ASSESSMENT
Patient is a 32F with no reported PMH who presents to the ED for diarrhea.  Patient states that twelve days ago she ate a seafood meal along with "unpastuerized juice" and the vey next day she started to have watery diarrhea.  Patient states that she has been having upto 6 episodes of diarrhea everyday since that meal.  Diarrhea assoicated with lower abdominal pains.  4 days after the meal she developed chills and tension like headache - took her temperature and it was 103.  Patient states that throughout this time she has had reduced PO intake but denies history of nausea and vomiting.  Patient denies bloody stools or rice specks in stool.  Of note, patient lives in [place] - is in [place] because she is the HCP for her father who is in the MICU at [facility].  Febrile to 102.2 in ED, labs show hypokalemia.  Will admit to med surg.        Roommate had covid retest negative

## 2023-07-30 NOTE — PROGRESS NOTE ADULT - PROBLEM SELECTOR PLAN 3
Patient aware of teratoma  Follows with OBGYN
Likely due to prolong diarrhea - will replace
Likely due to prolong diarrhea - will replace    resolved today
Likely due to prolong diarrhea - will replace

## 2023-07-31 ENCOUNTER — TRANSCRIPTION ENCOUNTER (OUTPATIENT)
Age: 33
End: 2023-07-31

## 2023-07-31 VITALS
DIASTOLIC BLOOD PRESSURE: 80 MMHG | SYSTOLIC BLOOD PRESSURE: 120 MMHG | TEMPERATURE: 98 F | RESPIRATION RATE: 18 BRPM | HEART RATE: 72 BPM | OXYGEN SATURATION: 100 %

## 2023-07-31 LAB
ANION GAP SERPL CALC-SCNC: 6 MMOL/L — SIGNIFICANT CHANGE UP (ref 5–17)
BUN SERPL-MCNC: 6 MG/DL — LOW (ref 7–23)
CALCIUM SERPL-MCNC: 8.4 MG/DL — LOW (ref 8.5–10.1)
CHLORIDE SERPL-SCNC: 106 MMOL/L — SIGNIFICANT CHANGE UP (ref 96–108)
CO2 SERPL-SCNC: 25 MMOL/L — SIGNIFICANT CHANGE UP (ref 22–31)
CREAT SERPL-MCNC: 0.44 MG/DL — LOW (ref 0.5–1.3)
EGFR: 133 ML/MIN/1.73M2 — SIGNIFICANT CHANGE UP
GLUCOSE SERPL-MCNC: 85 MG/DL — SIGNIFICANT CHANGE UP (ref 70–99)
HCT VFR BLD CALC: 27.9 % — LOW (ref 34.5–45)
HGB BLD-MCNC: 8.9 G/DL — LOW (ref 11.5–15.5)
HIV 1+2 AB+HIV1 P24 AG SERPL QL IA: SIGNIFICANT CHANGE UP
MAGNESIUM SERPL-MCNC: 2 MG/DL — SIGNIFICANT CHANGE UP (ref 1.6–2.6)
MCHC RBC-ENTMCNC: 24.6 PG — LOW (ref 27–34)
MCHC RBC-ENTMCNC: 31.9 G/DL — LOW (ref 32–36)
MCV RBC AUTO: 77.1 FL — LOW (ref 80–100)
NRBC # BLD: 0 /100 WBCS — SIGNIFICANT CHANGE UP (ref 0–0)
PHOSPHATE SERPL-MCNC: 2.8 MG/DL — SIGNIFICANT CHANGE UP (ref 2.5–4.5)
PLATELET # BLD AUTO: 379 K/UL — SIGNIFICANT CHANGE UP (ref 150–400)
POTASSIUM SERPL-MCNC: 3.9 MMOL/L — SIGNIFICANT CHANGE UP (ref 3.5–5.3)
POTASSIUM SERPL-SCNC: 3.9 MMOL/L — SIGNIFICANT CHANGE UP (ref 3.5–5.3)
RBC # BLD: 3.62 M/UL — LOW (ref 3.8–5.2)
RBC # FLD: 17.3 % — HIGH (ref 10.3–14.5)
SODIUM SERPL-SCNC: 137 MMOL/L — SIGNIFICANT CHANGE UP (ref 135–145)
WBC # BLD: 5.48 K/UL — SIGNIFICANT CHANGE UP (ref 3.8–10.5)
WBC # FLD AUTO: 5.48 K/UL — SIGNIFICANT CHANGE UP (ref 3.8–10.5)

## 2023-07-31 PROCEDURE — 99238 HOSP IP/OBS DSCHRG MGMT 30/<: CPT

## 2023-07-31 PROCEDURE — 99232 SBSQ HOSP IP/OBS MODERATE 35: CPT

## 2023-07-31 RX ORDER — NYSTATIN 500MM UNIT
5 POWDER (EA) MISCELLANEOUS
Qty: 0 | Refills: 0 | DISCHARGE
Start: 2023-07-31

## 2023-07-31 RX ORDER — NYSTATIN 500MM UNIT
5 POWDER (EA) MISCELLANEOUS
Qty: 80 | Refills: 0
Start: 2023-07-31 | End: 2023-08-03

## 2023-07-31 RX ORDER — FLUCONAZOLE 150 MG/1
150 TABLET ORAL ONCE
Refills: 0 | Status: COMPLETED | OUTPATIENT
Start: 2023-07-31 | End: 2023-07-31

## 2023-07-31 RX ORDER — NYSTATIN 500MM UNIT
500000 POWDER (EA) MISCELLANEOUS
Refills: 0 | Status: DISCONTINUED | OUTPATIENT
Start: 2023-08-01 | End: 2023-07-31

## 2023-07-31 RX ORDER — DIPHENOXYLATE HCL/ATROPINE 2.5-.025MG
1 TABLET ORAL
Qty: 0 | Refills: 0 | DISCHARGE
Start: 2023-07-31

## 2023-07-31 RX ADMIN — CEFTRIAXONE 100 MILLIGRAM(S): 500 INJECTION, POWDER, FOR SOLUTION INTRAMUSCULAR; INTRAVENOUS at 12:50

## 2023-07-31 RX ADMIN — Medication 1 TABLET(S): at 16:36

## 2023-07-31 RX ADMIN — FLUCONAZOLE 150 MILLIGRAM(S): 150 TABLET ORAL at 10:43

## 2023-07-31 NOTE — DISCHARGE NOTE NURSING/CASE MANAGEMENT/SOCIAL WORK - NSDCPEFALRISK_GEN_ALL_CORE
For information on Fall & Injury Prevention, visit: [email or web address] OR  [email or web address] OR  https://www.cdc.gov/steadi/patient.html

## 2023-07-31 NOTE — PROGRESS NOTE ADULT - NS ATTEND AMEND GEN_ALL_CORE FT
I have reviewed all pertinent clinical information and agree with the NP's note.   HIV nonreactive  overall improving   wants to go home as father is ill in the hospital  continue ceftriaxone 2g q24hrs while in the hospital   ok to discharge on PO bactrim DS 2 tab two times a day for 14 days until 8/11    Discussed with Dr. Corey Palacios, DO  Infectious Disease Attending  Reachable via Microsoft Teams or ID office: [phone]  After 5pm/weekends please call [phone] for all inquiries and new consults

## 2023-07-31 NOTE — PROGRESS NOTE ADULT - REASON FOR ADMISSION
colitis, r/o infectious diarrhea

## 2023-07-31 NOTE — DISCHARGE NOTE PROVIDER - NSDCMRMEDTOKEN_GEN_ALL_CORE_FT
Bactrim  mg-160 mg oral tablet: 2 tab(s) orally 2 times a day  nystatin 100,000 units/mL oral suspension: 5 milliliter(s) orally 4 times a day

## 2023-07-31 NOTE — DISCHARGE NOTE PROVIDER - CARE PROVIDER_API CALL
Adis Palacios  Infectious Disease  [address] [place] [address]  Phone: [phone]  Fax: ()-  Follow Up Time: 1 week

## 2023-07-31 NOTE — PROGRESS NOTE ADULT - SUBJECTIVE AND OBJECTIVE BOX
CURTIS AUGUSTINE  MRN-[id number]    Follow Up:  Salmonella bacteremia    Interval History: the pt was seen and examined earlier, no acute distress, feeling much better overall. Pt complains of thrush and yeast like infection symptoms, no discharge, denies having frequent UTIs or yeast infections in the past. Pt reports having two loose bowel movements overnight, three yesterday over 24 hrs period. No fevers since 7/29, no leukocytosis.     PAST MEDICAL & SURGICAL HISTORY:  No pertinent past medical history      No significant past surgical history          ROS:    [ ] Unobtainable because:  [x ] All other systems negative    Constitutional: no fever, no chills  Head: no trauma  Eyes: no vision changes, no eye pain  ENT:  no sore throat, no rhinorrhea, thrush  Cardiovascular:  no chest pain, no palpitation  Respiratory:  no SOB, no cough  GI:  no abd pain, no vomiting, loose bowel movements   urinary: no dysuria, no hematuria, no flank pain, + yeast infection symptoms   musculoskeletal:  no joint pain, no joint swelling  skin:  no rash  neurology:  no headache, no seizure, no change in mental status  psych: no anxiety, no depression         Allergies  No Known Allergies        ANTIMICROBIALS:  cefTRIAXone   IVPB 2000 every 24 hours  cefTRIAXone   IVPB    fluconAZOLE   Tablet 150 once      OTHER MEDS:  acetaminophen     Tablet .. 650 milliGRAM(s) Oral every 6 hours PRN  aluminum hydroxide/magnesium hydroxide/simethicone Suspension 30 milliLiter(s) Oral every 4 hours PRN  diphenoxylate/atropine 1 Tablet(s) Oral two times a day PRN  ketorolac   Injectable 30 milliGRAM(s) IV Push every 24 hours PRN  melatonin 3 milliGRAM(s) Oral at bedtime PRN  ondansetron Injectable 4 milliGRAM(s) IV Push every 8 hours PRN  oxycodone    5 mG/acetaminophen 325 mG 1 Tablet(s) Oral every 4 hours PRN  oxycodone    5 mG/acetaminophen 325 mG 2 Tablet(s) Oral every 4 hours PRN      Vital Signs Last 24 Hrs  T(C): 36.4 (31 Jul 2023 05:56), Max: 36.8 (30 Jul 2023 11:39)  T(F): 97.6 (31 Jul 2023 05:56), Max: 98.3 (30 Jul 2023 11:39)  HR: 63 (31 Jul 2023 05:56) (63 - 100)  BP: 105/69 (31 Jul 2023 05:56) (96/66 - 120/78)  BP(mean): --  RR: 17 (31 Jul 2023 05:56) (17 - 18)  SpO2: 98% (31 Jul 2023 05:56) (98% - 99%)    Parameters below as of 31 Jul 2023 00:15  Patient On (Oxygen Delivery Method): room air        Physical Exam:  Constitutional: non-toxic, no distress  HEAD/EYES: anicteric, no conjunctival injection  ENT:  supple, + thrush  Cardiovascular:   normal S1, S2, no murmur, no edema  Respiratory:  clear BS bilaterally, no wheezes, no rales  GI:  soft, non-tender, normal bowel sounds  :  no kim, no CVA tenderness  Musculoskeletal:  no synovitis, normal ROM  Neurologic: awake and alert, normal strength, no focal findings  Skin:  no rash, no erythema, no phlebitis  Heme/Onc: no lymphadenopathy   Psychiatric:  awake, alert, appropriate mood    WBC Count: 5.48 K/uL (07-31 @ 06:37)  WBC Count: 4.78 K/uL (07-30 @ 07:34)  WBC Count: 3.88 K/uL (07-29 @ 07:30)  WBC Count: 4.21 K/uL (07-28 @ 07:13)  WBC Count: 2.97 K/uL (07-27 @ 08:10)  WBC Count: 3.01 K/uL (07-26 @ 21:05)  WBC Count: 4.28 K/uL (07-26 @ 12:50)                            8.9    5.48  )-----------( 379      ( 31 Jul 2023 06:37 )             27.9       07-31    137  |  106  |  6<L>  ----------------------------<  85  3.9   |  25  |  0.44<L>    Ca    8.4<L>      31 Jul 2023 06:37  Phos  2.8     07-31  Mg     2.0     07-31    TPro  6.3  /  Alb  2.3<L>  /  TBili  0.1<L>  /  DBili  x   /  AST  30  /  ALT  27  /  AlkPhos  62  07-30      Urinalysis Basic - ( 31 Jul 2023 06:37 )    Color: x / Appearance: x / SG: x / pH: x  Gluc: 85 mg/dL / Ketone: x  / Bili: x / Urobili: x   Blood: x / Protein: x / Nitrite: x   Leuk Esterase: x / RBC: x / WBC x   Sq Epi: x / Non Sq Epi: x / Bacteria: x        Creatinine Trend: 0.44<--, 0.55<--, 0.57<--, 0.52<--, 0.52<--, 0.72<--      MICROBIOLOGY:  v  .Stool Feces  07-29-23   Numerous Yeast like cells isolated  No enteric gram negative rods isolated  No enteric pathogens to date: Final culture pending  --  --      .Blood Blood  07-29-23   No growth at 24 hours  --  --      .Blood Blood-Peripheral  07-27-23   Growth in anaerobic bottle: Salmonella species, not typhi/paratyphi  See previous culture [id number]  --  Blood Culture PCR  Salmonella species, not typhi/paratyphi      Clean Catch Clean Catch (Midstream)  07-26-23   <10,000 CFU/mL Normal Urogenital Natacha  --  --          Rapid RVP Result: NotDetec (07-27 @ 23:10)  C Diff by PCR Result: NotDetec (07-27 @ 16:00)  Rapid RVP Result: NotDetec (07-26 @ 12:50)    C Diff by PCR Result: NotDetec (07-27-23 @ 16:00)        SARS-CoV-2 Result: NotDetec (07-29-23 @ 13:28)  Rapid RVP Result: NotDetec (07-27-23 @ 23:10)  SARS-CoV-2: NotDetec (07-27-23 @ 23:10)  SARS-CoV-2: NotDetec (07-26-23 @ 12:50)  Rapid RVP Result: NotDetec (07-26-23 @ 12:50)    SARS-CoV-2: NotDetec (27 Jul 2023 23:10)  SARS-CoV-2: NotDetec (26 Jul 2023 12:50)    RADIOLOGY:    
HPI:  Patient is a 32F with no reported PMH who presents to the ED for diarrhea.  Patient states that twelve days ago she ate a seafood meal along with "unpastuerized juice" and the vey next day she started to have watery diarrhea.  Patient states that she has been having upto 6 episodes of diarrhea everyday since that meal.  Diarrhea assoicated with lower abdominal pains.  4 days after the meal she developed chills and tension like headache - took her temperature and it was 103.  Patient states that throughout this time she has had reduced PO intake but denies history of nausea and vomiting.  Patient denies bloody stools or rice specks in stool.  Of note, patient lives in [place] - is in [place] because she is the HCP for her father who is in the MICU at [facility].  Febrile to 102.2 in ED, labs show hypokalemia.  Will admit to med surg.   (26 Jul 2023 23:10)      Patient is a 32y old  Female who presents with a chief complaint of colitis, r/o infectious diarrhea (27 Jul 2023 16:44)      INTERVAL HPI/OVERNIGHT EVENTS: continues to have fever headache     MEDICATIONS  (STANDING):  cefTRIAXone   IVPB 2000 milliGRAM(s) IV Intermittent once  cefTRIAXone   IVPB      ketorolac   Injectable 30 milliGRAM(s) IV Push once  potassium chloride    Tablet ER 40 milliEquivalent(s) Oral every 4 hours  potassium chloride  10 mEq/100 mL IVPB 10 milliEquivalent(s) IV Intermittent every 1 hour    MEDICATIONS  (PRN):  acetaminophen     Tablet .. 650 milliGRAM(s) Oral every 6 hours PRN Temp greater or equal to 38C (100.4F), Moderate Pain (4 - 6)  aluminum hydroxide/magnesium hydroxide/simethicone Suspension 30 milliLiter(s) Oral every 4 hours PRN Dyspepsia  melatonin 3 milliGRAM(s) Oral at bedtime PRN Insomnia  ondansetron Injectable 4 milliGRAM(s) IV Push every 8 hours PRN Nausea and/or Vomiting  oxycodone    5 mG/acetaminophen 325 mG 1 Tablet(s) Oral every 4 hours PRN Moderate Pain (4 - 6)  oxycodone    5 mG/acetaminophen 325 mG 2 Tablet(s) Oral every 4 hours PRN Severe Pain (7 - 10)      Allergies    No Known Allergies    Intolerances        REVIEW OF SYSTEMS:  CONSTITUTIONAL: fever  EYES: No eye pain, visual disturbances, or discharge  ENMT:  No difficulty hearing, tinnitus, vertigo; No sinus or throat pain  NECK: No pain or stiffness  RESPIRATORY: No cough, wheezing, chills or hemoptysis; No shortness of breath  CARDIOVASCULAR: No chest pain, palpitations, dizziness, or leg swelling  GASTROINTESTINAL: No abdominal or epigastric pain. No nausea, vomiting, or hematemesis; No diarrhea or constipation. No melena or hematochezia.  GENITOURINARY: No dysuria, frequency, hematuria, or incontinence  NEUROLOGICAL: headaches   SKIN: No itching, burning, rashes, or lesions   LYMPH NODES: No enlarged glands  ENDOCRINE: No heat or cold intolerance; No hair loss  MUSCULOSKELETAL: No joint pain or swelling; No muscle, back, or extremity pain  PSYCHIATRIC: No depression, anxiety, mood swings, or difficulty sleeping  HEME/LYMPH: No easy bruising, or bleeding gums  ALLERGY AND IMMUNOLOGIC: No hives or eczema    Vital Signs Last 24 Hrs  T(C): 39.4 (28 Jul 2023 12:24), Max: 39.4 (28 Jul 2023 12:24)  T(F): 102.9 (28 Jul 2023 12:24), Max: 102.9 (28 Jul 2023 12:24)  HR: 81 (28 Jul 2023 12:24) (78 - 97)  BP: 105/72 (28 Jul 2023 12:24) (90/58 - 105/72)  RR: 17 (28 Jul 2023 12:24) (17 - 18)  SpO2: 98% (28 Jul 2023 12:24) (97% - 100%)    Parameters below as of 28 Jul 2023 12:24  Patient On (Oxygen Delivery Method): room air        PHYSICAL EXAM:  GENERAL:  fever   HEAD:  Atraumatic, Normocephalic  EYES: EOMI, PERRLA, conjunctiva and sclera clear  ENMT: No tonsillar erythema, exudates, or enlargement; Moist mucous membranes, Good dentition, No lesions  NECK: Supple, No JVD, Normal thyroid  NERVOUS SYSTEM:  Alert & Oriented X3, Good concentration; Motor Strength 5/5 B/L upper and lower extremities; DTRs 2+ intact and symmetric  CHEST/LUNG: Clear to ascultation  bilaterally; No rales, rhonchi, wheezing, or rubs  HEART: Regular rate and rhythm; No murmurs, rubs, or gallops  ABDOMEN: Soft, mild tenderness   EXTREMITIES:  2+ Peripheral Pulses, No clubbing, cyanosis, or edema  LYMPH: No lymphadenopathy noted  SKIN: No rashes or lesions    LABS:                        8.8    4.21  )-----------( 313      ( 28 Jul 2023 07:13 )             27.6     07-28    140  |  108  |  5<L>  ----------------------------<  90  3.2<L>   |  27  |  0.52    Ca    8.3<L>      28 Jul 2023 07:13  Mg     2.0     07-28        Urinalysis Basic - ( 28 Jul 2023 07:13 )    Color: x / Appearance: x / SG: x / pH: x  Gluc: 90 mg/dL / Ketone: x  / Bili: x / Urobili: x   Blood: x / Protein: x / Nitrite: x   Leuk Esterase: x / RBC: x / WBC x   Sq Epi: x / Non Sq Epi: x / Bacteria: x      CAPILLARY BLOOD GLUCOSE          RADIOLOGY & ADDITIONAL TESTS:    Imaging Personally Reviewed:  [ X] YES  [ ] NO    Consultant(s) Notes Reviewed:  [X ] YES  [ ] NO    Care Discussed with Consultants/Other Providers [X ] YES  [ ] NO
    Patient is a 32y old  Female who presents with a chief complaint of colitis, r/o infectious diarrhea (26 Jul 2023 23:10)        SUBJECTIVE / OVERNIGHT EVENTS: Patient had no acute events overnight. Patient seen and examined at bedside this morning. Diarrhea frequency improved, last BM today 5am. Diarrhea watery. No blood. No emesis     ROS: [ - ] Fever [ - ] Chills [ - ] Nausea/Vomiting [ - ] Chest Pain [ - ] Shortness of breath     MEDICATIONS  (STANDING):  ciprofloxacin   IVPB 400 milliGRAM(s) IV Intermittent every 12 hours  metroNIDAZOLE  IVPB 500 milliGRAM(s) IV Intermittent every 8 hours    MEDICATIONS  (PRN):  acetaminophen     Tablet .. 650 milliGRAM(s) Oral every 6 hours PRN Temp greater or equal to 38C (100.4F), Moderate Pain (4 - 6)  aluminum hydroxide/magnesium hydroxide/simethicone Suspension 30 milliLiter(s) Oral every 4 hours PRN Dyspepsia  melatonin 3 milliGRAM(s) Oral at bedtime PRN Insomnia  ondansetron Injectable 4 milliGRAM(s) IV Push every 8 hours PRN Nausea and/or Vomiting      Vital Signs Last 24 Hrs  T(C): 37.3 (27 Jul 2023 11:58), Max: 39.3 (27 Jul 2023 00:26)  T(F): 99.1 (27 Jul 2023 11:58), Max: 102.8 (27 Jul 2023 00:26)  HR: 98 (27 Jul 2023 11:58) (85 - 100)  BP: 102/67 (27 Jul 2023 11:58) (95/59 - 122/79)  BP(mean): --  RR: 18 (27 Jul 2023 11:58) (18 - 18)  SpO2: 98% (27 Jul 2023 11:58) (98% - 100%)    Parameters below as of 27 Jul 2023 11:58  Patient On (Oxygen Delivery Method): room air      CAPILLARY BLOOD GLUCOSE        I&O's Summary      PHYSICAL EXAM  GENERAL: NAD, lying comfortably in bed   HEENT:  Atraumatic, Normocephalic, EOMI, conjunctiva and sclera clear, no LAD  CHEST/LUNG: Clear to auscultation bilaterally; No wheeze  HEART: RRR, S1 and S2 No murmurs, rubs, or gallops  ABDOMEN: Soft, Nontender, Nondistended; Bowel sounds present  EXTREMITIES:  2+ Peripheral Pulses, No clubbing, cyanosis, or edema  NEURO: AAOx3, non-focal  SKIN: No rashes or lesions    LABS:                        9.2    2.97  )-----------( 144      ( 27 Jul 2023 08:10 )             29.2     07-27    138  |  111<H>  |  7   ----------------------------<  89  3.7   |  23  |  0.52    Ca    8.2<L>      27 Jul 2023 08:10    TPro  7.6  /  Alb  3.2<L>  /  TBili  0.3  /  DBili  x   /  AST  23  /  ALT  23  /  AlkPhos  63  07-26          Urinalysis Basic - ( 27 Jul 2023 08:10 )    Color: x / Appearance: x / SG: x / pH: x  Gluc: 89 mg/dL / Ketone: x  / Bili: x / Urobili: x   Blood: x / Protein: x / Nitrite: x   Leuk Esterase: x / RBC: x / WBC x   Sq Epi: x / Non Sq Epi: x / Bacteria: x          RADIOLOGY & ADDITIONAL TESTS:      Labs Personally Reviewed  Imaging Personally Reviewed  Consultant(s) Notes Reviewed   
HPI:  Patient is a 32F with no reported PMH who presents to the ED for diarrhea.  Patient states that twelve days ago she ate a seafood meal along with "unpastuerized juice" and the vey next day she started to have watery diarrhea.  Patient states that she has been having upto 6 episodes of diarrhea everyday since that meal.  Diarrhea assoicated with lower abdominal pains.  4 days after the meal she developed chills and tension like headache - took her temperature and it was 103.  Patient states that throughout this time she has had reduced PO intake but denies history of nausea and vomiting.  Patient denies bloody stools or rice specks in stool.  Of note, patient lives in [place] - is in [place] because she is the HCP for her father who is in the MICU at [facility].  Febrile to 102.2 in ED, labs show hypokalemia.  Will admit to med surg.   (26 Jul 2023 23:10)      Patient is a 32y old  Female who presents with a chief complaint of colitis, r/o infectious diarrhea (28 Jul 2023 16:22)      INTERVAL HPI/OVERNIGHT EVENTS: no acute events fever has improved     MEDICATIONS  (STANDING):  cefTRIAXone   IVPB      cefTRIAXone   IVPB 2000 milliGRAM(s) IV Intermittent every 24 hours    MEDICATIONS  (PRN):  acetaminophen     Tablet .. 650 milliGRAM(s) Oral every 6 hours PRN Temp greater or equal to 38C (100.4F), Moderate Pain (4 - 6)  aluminum hydroxide/magnesium hydroxide/simethicone Suspension 30 milliLiter(s) Oral every 4 hours PRN Dyspepsia  diphenoxylate/atropine 1 Tablet(s) Oral two times a day PRN Diarrhea  melatonin 3 milliGRAM(s) Oral at bedtime PRN Insomnia  ondansetron Injectable 4 milliGRAM(s) IV Push every 8 hours PRN Nausea and/or Vomiting  oxycodone    5 mG/acetaminophen 325 mG 1 Tablet(s) Oral every 4 hours PRN Moderate Pain (4 - 6)  oxycodone    5 mG/acetaminophen 325 mG 2 Tablet(s) Oral every 4 hours PRN Severe Pain (7 - 10)      Allergies    No Known Allergies    Intolerances        REVIEW OF SYSTEMS:  CONSTITUTIONAL: fever fatigue EYES: No eye pain, visual disturbances, or discharge  ENMT:  No difficulty hearing, tinnitus, vertigo; No sinus or throat pain  NECK: No pain or stiffness  BREASTS: No pain, masses, or nipple discharge  RESPIRATORY: No cough, wheezing, chills or hemoptysis; No shortness of breath  CARDIOVASCULAR: No chest pain, palpitations, dizziness, or leg swelling  GASTROINTESTINAL:  diarrhea  GENITOURINARY: No dysuria, frequency, hematuria, or incontinence  NEUROLOGICAL: No headaches, memory loss, loss of strength, numbness, or tremors  SKIN: No itching, burning, rashes, or lesions   LYMPH NODES: No enlarged glands  ENDOCRINE: No heat or cold intolerance; No hair loss  MUSCULOSKELETAL: No joint pain or swelling; No muscle, back, or extremity pain  PSYCHIATRIC: No depression, anxiety, mood swings, or difficulty sleeping  HEME/LYMPH: No easy bruising, or bleeding gums  ALLERGY AND IMMUNOLOGIC: No hives or eczema    Vital Signs Last 24 Hrs  T(C): 36.8 (29 Jul 2023 11:35), Max: 38.4 (29 Jul 2023 03:14)  T(F): 98.3 (29 Jul 2023 11:35), Max: 101.1 (29 Jul 2023 03:14)  HR: 80 (29 Jul 2023 11:35) (72 - 81)  BP: 100/66 (29 Jul 2023 11:35) (99/63 - 109/78)  BP(mean): --  RR: 18 (29 Jul 2023 11:35) (18 - 18)  SpO2: 98% (29 Jul 2023 11:35) (98% - 100%)    Parameters below as of 29 Jul 2023 11:35  Patient On (Oxygen Delivery Method): room air        PHYSICAL EXAM:  GENERAL: NAD, well-groomed, well-developed  HEAD:  Atraumatic, Normocephalic  EYES: EOMI, PERRLA, conjunctiva and sclera clear  ENMT: No tonsillar erythema, exudates, or enlargement; Moist mucous membranes, Good dentition, No lesions  NECK: Supple, No JVD, Normal thyroid  NERVOUS SYSTEM:  Alert & Oriented X3, Good concentration; Motor Strength 5/5 B/L upper and lower extremities; DTRs 2+ intact and symmetric  CHEST/LUNG: Clear to ascultation  bilaterally; No rales, rhonchi, wheezing, or rubs  HEART: Regular rate and rhythm; No murmurs, rubs, or gallops  ABDOMEN: Soft, Nontender, Nondistended; Bowel sounds present  EXTREMITIES:  2+ Peripheral Pulses, No clubbing, cyanosis, or edema  LYMPH: No lymphadenopathy noted  SKIN: No rashes or lesions    LABS:                        9.3    3.88  )-----------( 311      ( 29 Jul 2023 07:30 )             28.6     07-29    140  |  108  |  10  ----------------------------<  82  3.8   |  27  |  0.57    Ca    8.5      29 Jul 2023 07:30  Phos  3.3     07-29  Mg     2.0     07-29        Urinalysis Basic - ( 29 Jul 2023 07:30 )    Color: x / Appearance: x / SG: x / pH: x  Gluc: 82 mg/dL / Ketone: x  / Bili: x / Urobili: x   Blood: x / Protein: x / Nitrite: x   Leuk Esterase: x / RBC: x / WBC x   Sq Epi: x / Non Sq Epi: x / Bacteria: x      CAPILLARY BLOOD GLUCOSE          RADIOLOGY & ADDITIONAL TESTS:    Imaging Personally Reviewed:  [X ] YES  [ ] NO    Consultant(s) Notes Reviewed:  [X ] YES  [ ] NO    Care Discussed with Consultants/Other Providers [ X] YES  [ ] NO
HPI:  Patient is a 32F with no reported PMH who presents to the ED for diarrhea.  Patient states that twelve days ago she ate a seafood meal along with "unpastuerized juice" and the vey next day she started to have watery diarrhea.  Patient states that she has been having upto 6 episodes of diarrhea everyday since that meal.  Diarrhea assoicated with lower abdominal pains.  4 days after the meal she developed chills and tension like headache - took her temperature and it was 103.  Patient states that throughout this time she has had reduced PO intake but denies history of nausea and vomiting.  Patient denies bloody stools or rice specks in stool.  Of note, patient lives in [place] - is in [place] because she is the HCP for her father who is in the MICU at [facility].  Febrile to 102.2 in ED, labs show hypokalemia.  Will admit to med surg.   (26 Jul 2023 23:10)    Patient is a 32y old  Female who presents with a chief complaint of colitis, r/o infectious diarrhea (29 Jul 2023 12:46)      INTERVAL HPI/OVERNIGHT EVENTS:    MEDICATIONS  (STANDING):  cefTRIAXone   IVPB 2000 milliGRAM(s) IV Intermittent every 24 hours  cefTRIAXone   IVPB        MEDICATIONS  (PRN):  acetaminophen     Tablet .. 650 milliGRAM(s) Oral every 6 hours PRN Temp greater or equal to 38C (100.4F), Moderate Pain (4 - 6)  aluminum hydroxide/magnesium hydroxide/simethicone Suspension 30 milliLiter(s) Oral every 4 hours PRN Dyspepsia  diphenoxylate/atropine 1 Tablet(s) Oral two times a day PRN Diarrhea  ketorolac   Injectable 30 milliGRAM(s) IV Push every 24 hours PRN pain and or fever  melatonin 3 milliGRAM(s) Oral at bedtime PRN Insomnia  ondansetron Injectable 4 milliGRAM(s) IV Push every 8 hours PRN Nausea and/or Vomiting  oxycodone    5 mG/acetaminophen 325 mG 1 Tablet(s) Oral every 4 hours PRN Moderate Pain (4 - 6)  oxycodone    5 mG/acetaminophen 325 mG 2 Tablet(s) Oral every 4 hours PRN Severe Pain (7 - 10)      Allergies    No Known Allergies    Intolerances        REVIEW OF SYSTEMS:  CONSTITUTIONAL:  fatigue  EYES: No eye pain, visual disturbances, or discharge  ENMT:  No difficulty hearing, tinnitus, vertigo; No sinus or throat pain  NECK: No pain or stiffness  BREASTS: No pain, masses, or nipple discharge  RESPIRATORY: No cough, wheezing, chills or hemoptysis; No shortness of breath  CARDIOVASCULAR: No chest pain, palpitations, dizziness, or leg swelling  GASTROINTESTINAL:  diarrhea some tenderness   GENITOURINARY: No dysuria, frequency, hematuria, or incontinence  NEUROLOGICAL: No headaches, memory loss, loss of strength, numbness, or tremors  SKIN: No itching, burning, rashes, or lesions   LYMPH NODES: No enlarged glands  ENDOCRINE: No heat or cold intolerance; No hair loss  MUSCULOSKELETAL: No joint pain or swelling; No muscle, back, or extremity pain  PSYCHIATRIC: No depression, anxiety, mood swings, or difficulty sleeping  HEME/LYMPH: No easy bruising, or bleeding gums  ALLERGY AND IMMUNOLOGIC: No hives or eczema    Vital Signs Last 24 Hrs  T(C): 36.8 (30 Jul 2023 17:12), Max: 36.8 (30 Jul 2023 00:26)  T(F): 98.2 (30 Jul 2023 17:12), Max: 98.3 (30 Jul 2023 11:39)  HR: 99 (30 Jul 2023 17:12) (58 - 100)  BP: 120/78 (30 Jul 2023 17:12) (91/57 - 120/78)  RR: 18 (30 Jul 2023 17:12) (17 - 18)  SpO2: 99% (30 Jul 2023 17:12) (98% - 100%)    Parameters below as of 30 Jul 2023 17:12  Patient On (Oxygen Delivery Method): room air        PHYSICAL EXAM:  GENERAL: NAD,   HEAD:  Atraumatic, Normocephalic  EYES: EOMI, PERRLA, conjunctiva and sclera clear  ENMT: No tonsillar erythema, exudates, or enlargement; Moist mucous membranes, Good dentition, No lesions  NECK: Supple, No JVD, Normal thyroid  NERVOUS SYSTEM:  Alert & Oriented X3, Good concentration; Motor Strength 5/5 B/L upper and lower extremities; DTRs 2+ intact and symmetric  CHEST/LUNG: Clear to ascultation  bilaterally; No rales, rhonchi, wheezing, or rubs  HEART: Regular rate and rhythm; No murmurs, rubs, or gallops  ABDOMEN: Soft, Nontender, Nondistended; Bowel sounds present  EXTREMITIES:  2+ Peripheral Pulses, No clubbing, cyanosis, or edema  LYMPH: No lymphadenopathy noted  SKIN: No rashes or lesions    LABS:                        8.9    4.78  )-----------( 349      ( 30 Jul 2023 07:34 )             27.2     07-30    136  |  103  |  5<L>  ----------------------------<  80  3.8   |  29  |  0.55    Ca    8.5      30 Jul 2023 07:34  Phos  3.7     07-30  Mg     1.9     07-30    TPro  6.3  /  Alb  2.3<L>  /  TBili  0.1<L>  /  DBili  x   /  AST  30  /  ALT  27  /  AlkPhos  62  07-30      Urinalysis Basic - ( 30 Jul 2023 07:34 )    Color: x / Appearance: x / SG: x / pH: x  Gluc: 80 mg/dL / Ketone: x  / Bili: x / Urobili: x   Blood: x / Protein: x / Nitrite: x   Leuk Esterase: x / RBC: x / WBC x   Sq Epi: x / Non Sq Epi: x / Bacteria: x  GI PCR Panel Stool (07.29.23 @ 22:45)    GI PCR Panel: [facility]: GI Panel PCR evaluates for:  Campylobacter, Plesiomonas shigelloides, Salmonella, Vibrio, Yersinia  enterocolitica, Enteroaggregative Escherichia (EAEC), Enteropathogenic E.  coli (EPEC), Enterotoxigenic E. coli (ETEC), Shiga-like toxin producing  E.coli (STEC), E. coli O157, Shigella/Enteroinvasive E. coli (EIEC),  Adenovirus, Astrovirus, Norovirus, Rotavirus, Sapovirus, Cryptosporidium,  Cyclospora cayetanensis, Entamoeba histolytica, Giardia lamblia.  For culture and susceptibility reports refer to "reflex stool culture".        CAPILLARY BLOOD GLUCOSE          RADIOLOGY & ADDITIONAL TESTS:    Imaging Personally Reviewed:  [ ] YES  [ ] NO    Consultant(s) Notes Reviewed:  [ ] YES  [ ] NO    Care Discussed with Consultants/Other Providers [ ] YES  [ ] NO

## 2023-07-31 NOTE — DISCHARGE NOTE PROVIDER - ATTENDING DISCHARGE PHYSICAL EXAMINATION:
GENERAL: no acute distress. Alert and oriented x 4  CARDIO: normal s1, s2. no murmur  RESPIRATORY: CTABL  GI: non -tender, no rebound, no guarding

## 2023-07-31 NOTE — DISCHARGE NOTE NURSING/CASE MANAGEMENT/SOCIAL WORK - PATIENT PORTAL LINK FT
You can access the FollowMyHealth Patient Portal offered by [facility] by registering at the following website: [email or web address]. By joining [employer]’s FollowMyHealth portal, you will also be able to view your health information using other applications (apps) compatible with our system.

## 2023-07-31 NOTE — DISCHARGE NOTE PROVIDER - NSDCQMCOGNITION_NEU_ALL_CORE
Independent in bed mobility- supine<>sit, rolling side<>side observing proper body mechanics, proper positioning, body alignment and precautions.
No difficulties

## 2023-07-31 NOTE — DISCHARGE NOTE PROVIDER - NSDCCPCAREPLAN_GEN_ALL_CORE_FT
PRINCIPAL DISCHARGE DIAGNOSIS  Diagnosis: Pancolitis  Assessment and Plan of Treatment:       SECONDARY DISCHARGE DIAGNOSES  Diagnosis: Bandemia  Assessment and Plan of Treatment:     Diagnosis: Ovarian teratoma  Assessment and Plan of Treatment:

## 2023-07-31 NOTE — DISCHARGE NOTE PROVIDER - HOSPITAL COURSE
33 yo female w/ no pmhx admitted to [facility] for sepsis 2/2 salmonella bacteremia secondary to food poisoning. Around 12 days ago, pt ate seafood with "unpasteurized juice" and the following day she had profuse watery diarrhea (up to 6 episodes every day) and had a fever of up to 103. Pt was febrile in the ED, with temp of 102. CT abd/pelvis showed pancolitis.  INFECTIOUS DISEASE with Mitzy was consulted and pt treated with course of Rocephin 2 gram. Pt's sepsis resolved but pt developed thrush. Pt given 1 dose of diflucan. Pt found stable for DC home with dual strength bactrim x2 BID till august 11th.

## 2023-07-31 NOTE — PROGRESS NOTE ADULT - ASSESSMENT
32F with no reported PMH who presents to the ED for diarrhea.  Patient states that twelve days ago she ate a seafood meal along with "unpastuerized juice" and the vey next day she started to have watery diarrhea.  Patient states that she has been having upto 6 episodes of diarrhea everyday since that meal.  Diarrhea assoicated with lower abdominal pains.  4 days after the meal she developed chills and tension like headache - took her temperature and it was 103.  Patient states that throughout this time she has had reduced PO intake but denies history of nausea and vomiting.  Patient denies bloody stools or rice specks in stool.  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    Febrile to 102.2 in ED, labs show hypokalemia  CT shows pancolitis   Blood cultures positive for salmonella   likely food related   fever can last several days   need to document clearance from blood stream     7/31: afebrile since 7/29, no wbc, cr and LFTs ok, only one set of BCs was repeated - NGTD, will repeat BCs x 2, stool culture - yeast like cells, C. Diff negative, GI PCR negative, diarrhea is improving, feeling better overall. Pt has developed thrush and possible candidiasis, will treat with antifungals, will test for HIV.   When the pt will be ready to be discharged, will change abx to  po Bactrim, continue with Ceftriaxone while in pt, sensitive.     1. Salmonella Bacteremia  2. Gastroenteritis   3. Thrush  4. Fevers         Plan:  continue ceftriaxone 2g q24hrs   repeat blood cultures x2 sets - ordered  follow all culture data   monitor stool counts  check electrolytes daily and replace   give a dose of Diflucan - ordered   HIV test - ordered     discussed with Dr. Palacios  32F with no reported PMH who presents to the ED for diarrhea.  Patient states that twelve days ago she ate a seafood meal along with "unpastuerized juice" and the vey next day she started to have watery diarrhea.  Patient states that she has been having upto 6 episodes of diarrhea everyday since that meal.  Diarrhea assoicated with lower abdominal pains.  4 days after the meal she developed chills and tension like headache - took her temperature and it was 103.  Patient states that throughout this time she has had reduced PO intake but denies history of nausea and vomiting.  Patient denies bloody stools or rice specks in stool.  Of note, patient lives in [place] - is in [place] because she is the HCP for her father who is in the MICU at [facility].    Febrile to 102.2 in ED, labs show hypokalemia  CT shows pancolitis   Blood cultures positive for salmonella   likely food related   fever can last several days   need to document clearance from blood stream     7/31: afebrile since 7/29, no wbc, cr and LFTs ok, only one set of BCs was repeated - NGTD, will repeat BCs x 2, stool culture - yeast like cells, C. Diff negative, GI PCR negative, diarrhea is improving, feeling better overall. Pt has developed thrush and possible candidiasis, will treat with antifungals, will test for HIV.   When the pt will be ready to be discharged, will change abx to  po Bactrim, continue with Ceftriaxone while in pt, sensitive.     1. Salmonella Bacteremia  2. Gastroenteritis   3. Thrush  4. Fevers         Plan:  continue ceftriaxone 2g q24hrs   repeat blood cultures x2 sets - ordered  follow all culture data   monitor stool counts  check electrolytes daily and replace   give a dose of Diflucan - ordered   HIV test - ordered   start nystatin solution for thrush tomorrow - order is in    discussed with Dr. Palacios  32F with no reported PMH who presents to the ED for diarrhea.  Patient states that twelve days ago she ate a seafood meal along with "unpastuerized juice" and the vey next day she started to have watery diarrhea.  Patient states that she has been having upto 6 episodes of diarrhea everyday since that meal.  Diarrhea assoicated with lower abdominal pains.  4 days after the meal she developed chills and tension like headache - took her temperature and it was 103.  Patient states that throughout this time she has had reduced PO intake but denies history of nausea and vomiting.  Patient denies bloody stools or rice specks in stool.  Of note, patient lives in [place] - is in [place] because she is the HCP for her father who is in the MICU at [facility].    Febrile to 102.2 in ED, labs show hypokalemia  CT shows pancolitis   Blood cultures positive for salmonella   likely food related   fever can last several days   need to document clearance from blood stream     7/31: afebrile since 7/29, no wbc, cr and LFTs ok, only one set of BCs was repeated - NGTD, will repeat BCs x 2, stool culture - yeast like cells, C. Diff negative, GI PCR negative, diarrhea is improving, feeling better overall. Pt has developed thrush and possible candidiasis, will treat with antifungals, will test for HIV.   When the pt will be ready to be discharged, will change abx to  po Bactrim, continue with Ceftriaxone while in pt, sensitive.     1. Salmonella Bacteremia  2. Gastroenteritis   3. Thrush  4. Fevers         Plan:  continue ceftriaxone 2g q24hrs   ok to discharge on PO bactrim DS 2 tab two times a day for 14 days until 8/11  repeat blood cultures x2 sets - ordered  follow all culture data   monitor stool counts  check electrolytes daily and replace   give a dose of Diflucan - ordered   HIV test - negative  start nystatin solution for thrush tomorrow - order is in    discussed with Dr. Palacios

## 2023-08-01 LAB
CULTURE RESULTS: SIGNIFICANT CHANGE UP
SPECIMEN SOURCE: SIGNIFICANT CHANGE UP

## 2023-08-03 DIAGNOSIS — B37.0 CANDIDAL STOMATITIS: ICD-10-CM

## 2023-08-03 DIAGNOSIS — K51.00 ULCERATIVE (CHRONIC) PANCOLITIS WITHOUT COMPLICATIONS: ICD-10-CM

## 2023-08-03 DIAGNOSIS — A02.1 SALMONELLA SEPSIS: ICD-10-CM

## 2023-08-03 DIAGNOSIS — D27.0 BENIGN NEOPLASM OF RIGHT OVARY: ICD-10-CM

## 2023-08-03 DIAGNOSIS — E87.6 HYPOKALEMIA: ICD-10-CM

## 2023-08-03 DIAGNOSIS — D72.825 BANDEMIA: ICD-10-CM

## 2023-08-03 LAB
CULTURE RESULTS: SIGNIFICANT CHANGE UP
SPECIMEN SOURCE: SIGNIFICANT CHANGE UP

## 2023-08-05 LAB
CULTURE RESULTS: SIGNIFICANT CHANGE UP
CULTURE RESULTS: SIGNIFICANT CHANGE UP
SPECIMEN SOURCE: SIGNIFICANT CHANGE UP
SPECIMEN SOURCE: SIGNIFICANT CHANGE UP

## 2023-08-31 ENCOUNTER — LABORATORY RESULT (OUTPATIENT)
Age: 33
End: 2023-08-31

## 2023-08-31 ENCOUNTER — APPOINTMENT (OUTPATIENT)
Dept: INFECTIOUS DISEASE | Facility: CLINIC | Age: 33
End: 2023-08-31
Payer: MEDICAID

## 2023-08-31 ENCOUNTER — OUTPATIENT (OUTPATIENT)
Dept: OUTPATIENT SERVICES | Facility: HOSPITAL | Age: 33
LOS: 1 days | End: 2023-08-31
Payer: MEDICAID

## 2023-08-31 VITALS
SYSTOLIC BLOOD PRESSURE: 118 MMHG | WEIGHT: 157 LBS | BODY MASS INDEX: 24.64 KG/M2 | HEART RATE: 82 BPM | DIASTOLIC BLOOD PRESSURE: 75 MMHG | TEMPERATURE: 98.2 F | OXYGEN SATURATION: 98 % | HEIGHT: 67 IN

## 2023-08-31 DIAGNOSIS — L73.2 HIDRADENITIS SUPPURATIVA: ICD-10-CM

## 2023-08-31 DIAGNOSIS — B97.89 OTHER VIRAL AGENTS AS THE CAUSE OF DISEASES CLASSIFIED ELSEWHERE: ICD-10-CM

## 2023-08-31 DIAGNOSIS — R78.81 BACTEREMIA: ICD-10-CM

## 2023-08-31 DIAGNOSIS — A02.0 SALMONELLA ENTERITIS: ICD-10-CM

## 2023-08-31 PROCEDURE — G0463: CPT

## 2023-08-31 PROCEDURE — 99213 OFFICE O/P EST LOW 20 MIN: CPT

## 2023-08-31 RX ORDER — SPIRONOLACTONE 50 MG/1
50 TABLET ORAL
Qty: 1 | Refills: 3 | Status: DISCONTINUED | COMMUNITY
Start: 2019-06-27 | End: 2023-08-31

## 2023-08-31 RX ORDER — ERYTHROMYCIN 5 MG/G
5 OINTMENT OPHTHALMIC
Qty: 1 | Refills: 1 | Status: DISCONTINUED | COMMUNITY
Start: 2018-10-08 | End: 2023-08-31

## 2023-08-31 NOTE — HISTORY OF PRESENT ILLNESS
[FreeTextEntry1] : 33F with recent hx of salmonella bacteremia  left hospital anemic but not experiencing symptoms  BM are not her normlabut much better  no abd pain, eating well  rest of ROS negative

## 2023-09-12 LAB
BASOPHILS # BLD AUTO: 0.05 K/UL
BASOPHILS NFR BLD AUTO: 1.1 %
EOSINOPHIL # BLD AUTO: 0.13 K/UL
EOSINOPHIL NFR BLD AUTO: 3 %
HCT VFR BLD CALC: 33.4 %
HGB BLD-MCNC: 10.3 G/DL
IMM GRANULOCYTES NFR BLD AUTO: 0 %
LYMPHOCYTES # BLD AUTO: 2.27 K/UL
LYMPHOCYTES NFR BLD AUTO: 51.6 %
MAN DIFF?: NORMAL
MCHC RBC-ENTMCNC: 24.1 PG
MCHC RBC-ENTMCNC: 30.8 GM/DL
MCV RBC AUTO: 78 FL
MONOCYTES # BLD AUTO: 0.36 K/UL
MONOCYTES NFR BLD AUTO: 8.2 %
NEUTROPHILS # BLD AUTO: 1.59 K/UL
NEUTROPHILS NFR BLD AUTO: 36.1 %
PLATELET # BLD AUTO: 381 K/UL
RBC # BLD: 4.28 M/UL
RBC # FLD: 18.6 %
WBC # FLD AUTO: 4.4 K/UL

## 2023-12-08 NOTE — ED ADULT NURSE NOTE - TEMPLATE
Heart Failure Clinic  Pharmacist Note     Yumiko Purdy is a 58 y.o. female seen in the Heart Failure Clinic for HFrEF. The patient was recently hospitalized for an acute exacerbation among other issues and upon discharge her amiodarone, farxiga and verquvo were discontinued and she was started on jardiance, toprol and decreased bumex to 1mg daily.     Yumiko Purdy reports a fair understanding of medications. She reports that since her Bumex has been decreased to 1mg daily, she has noticed a lot more swelling in her legs and feet and reports some SOB. She is unable to weigh herself. She reports that she takes her BP ever so often and reports that it was always low and therefore she stopped taking her Toprol due to the hold parameter of SBP <100 of which she was meeting most of the time. She reports that she deals with some urinary issues, could not decipher if yeast/fungal infection or UTI, as patient did not want to explain further. I am assuming because her son was in the room as well. But she reports that it is manageable.      Medication Use:   Hx of med intolerances: Farxiga (UTI) -but Nephro started back in September  Retail Rx Management: Uses [facility]/ Uses our pharmacy for verquvo, toprol and jardiance     Past Medical History:   Diagnosis Date    Anemia     CHF (congestive heart failure)     Chronic a-fib     stated by patient     Cirrhosis of liver     stated by patient     Diabetes mellitus     Ventricular tachycardia      ALLERGIES: Phenergan [promethazine]  Current Outpatient Medications   Medication Sig Dispense Refill    acetaminophen (TYLENOL) 500 MG tablet Take 1 tablet by mouth 2 (Two) Times a Day As Needed for Mild Pain.      apixaban (ELIQUIS) 5 MG tablet tablet Take 1 tablet by mouth Every 12 (Twelve) Hours. Indications: Atrial Fibrillation 60 tablet 3    aspirin 81 MG EC tablet Take 1 tablet by mouth Daily for 30 days. 30 tablet 0    benzonatate (TESSALON) 100 MG capsule Take 1 capsule by mouth  2 (Two) Times a Day As Needed for Cough.      bumetanide (BUMEX) 1 MG tablet Take 1 tablets by mouth Daily      busPIRone (BUSPAR) 10 MG tablet Take 1 tablet by mouth 2 (Two) Times a Day.      empagliflozin (JARDIANCE) 10 MG tablet tablet Take 1 tablet by mouth Daily for 30 days. 30 tablet 0    erythromycin (ROMYCIN) 5 MG/GM ophthalmic ointment Administer 1 application  to both eyes Every 6 (Six) Hours.      ferrous sulfate 325 (65 FE) MG tablet Take 1 tablet by mouth 2 (Two) Times a Day.      fluticasone (FLONASE) 50 MCG/ACT nasal spray 2 sprays into the nostril(s) as directed by provider Daily.      Insulin Glargine (LANTUS SOLOSTAR) 100 UNIT/ML injection pen Inject 45 Units under the skin into the appropriate area as directed 2 (Two) Times a Day. 30 mL 0    Insulin Lispro, 1 Unit Dial, (HUMALOG) 100 UNIT/ML solution pen-injector Inject 15 Units under the skin into the appropriate area as directed 3 (Three) Times a Day With Meals. 15 mL 1    levothyroxine (SYNTHROID, LEVOTHROID) 50 MCG tablet Take 1 tablet by mouth Daily.      nitroglycerin (NITROSTAT) 0.4 MG SL tablet Place 1 tablet under the tongue Every 5 (Five) Minutes As Needed for Chest Pain. Take no more than 3 doses in 15 minutes.      omeprazole (priLOSEC) 20 MG capsule Take 1 capsule by mouth Daily.      pregabalin (LYRICA) 150 MG capsule Take 1 capsule by mouth 2 (Two) Times a Day.      Vortioxetine HBr (Trintellix) 5 MG tablet tablet Take 1 tablet by mouth Daily With Breakfast.      metoprolol succinate XL (TOPROL-XL) 25 MG 24 hr tablet Take 1/2 tablet by mouth Daily for 30 days. Do not take if blood pressure is less than 100 on the top number. (Patient not taking: Reported on 12/8/2023) 15 tablet 0     No current facility-administered medications for this encounter.       Vaccination History:   Pneumonia: Needs, declines  Annual Influenza: Needs, declines      Objective  There were no vitals filed for this visit.      Wt Readings from Last 3  Encounters:   11/21/23 109 kg (239 lb 11.2 oz)   10/15/23 113 kg (250 lb)   09/29/23 109 kg (240 lb)     Lab Results   Component Value Date    GLUCOSE 217 (H) 11/21/2023    BUN 34 (H) 11/21/2023    CREATININE 1.51 (H) 11/21/2023    EGFRIFNONA 49 (L) 11/10/2020    BCR 22.5 11/21/2023    K 4.2 11/21/2023    CO2 28.1 11/21/2023    CALCIUM 8.9 11/21/2023    ALBUMIN 3.5 11/19/2023    LABIL2 1.3 (L) 06/09/2016    AST 18 11/19/2023    ALT 14 11/19/2023     Lab Results   Component Value Date    WBC 8.33 11/21/2023    HGB 8.4 (L) 11/21/2023    HCT 30.8 (L) 11/21/2023    .4 (H) 11/21/2023     11/21/2023     Lab Results   Component Value Date    CKTOTAL 41 11/12/2023    TROPONINT 127 (C) 11/19/2023     Lab Results   Component Value Date    PROBNP 2,085.0 (H) 11/20/2023     Results for orders placed during the hospital encounter of 11/12/23    Adult Transthoracic Echo Complete W/ Cont if Necessary Per Protocol    Interpretation Summary    Left ventricular systolic function is moderately decreased. Left ventricular ejection fraction appears to be 31 - 35%.    Left ventricular wall thickness is consistent with mild concentric hypertrophy.    Left ventricular diastolic function is consistent with (grade III w/high LAP) fixed restrictive pattern.    Mildly reduced right ventricular systolic function noted.    The left atrial cavity is moderately dilated.    The right atrial cavity is mild to moderately  dilated.    There is calcification of the aortic valve mainly affecting the left coronary cusp(s).    Dilated pulmonary artery.         GDMT    Drug Class   Drug   Dose Last Dose Adjustment Additional Titration   Notes   ACEi/ARB/ARNI     Was previously on lisinopril, stopped d/t renal function    Beta Blocker Toprol 12.5mg 12/8/23 - restart  Patient self-stopped due to hypoTN ~12/2023   MRA     Was previously on spironolactone 50mg BID Nov-Dec 22, stopped d/t renal function   SGLT2i Jardiance 10mg Changed from Farxiga  in hospital 11/21/23 N/A Stopped 8/17/23 due to UTI    Verquvo 10 mg QD 12/8/23 restart         Drug Therapy Problems    1. Edema  2. Non-adherence with Toprol  3. GDMT  4. Needs refills on Toprol and Jardiance, as only 1 month supply was sent in on discharge.   5. Patient had reported some issues with urinary issues- but then reported that they were manageable     Recommendations:     ProBNP improved. Sonja to hold off on increasing diuretics at this time. Patient sees Nephrology soon. Continue to monitor.   Recommended for her to start taking her BP daily and if SBP is >100, to take her Toprol as prescribed. I counseled her on the benefits of Toprol in addition to BP and she understands now why she needs to take it.   Verquvo discontinued on discharge- Sonja to restart.   Sonja to send in refills  Follow-up on at next visit.     Patient was educated on heart failure medications and the importance of medication adherence. All questions were addressed and patient expressed some understanding. Would benefit from additional education at each visit.    Thank you for allowing me to participate in the care of your patient,    Kelli Soto [facility]  12/08/23  13:49 EST    General

## 2024-08-01 ENCOUNTER — EMERGENCY (EMERGENCY)
Facility: HOSPITAL | Age: 34
LOS: 0 days | Discharge: HOME | End: 2024-08-01
Attending: STUDENT IN AN ORGANIZED HEALTH CARE EDUCATION/TRAINING PROGRAM
Payer: MEDICAID

## 2024-08-01 VITALS
OXYGEN SATURATION: 99 % | HEART RATE: 100 BPM | SYSTOLIC BLOOD PRESSURE: 127 MMHG | WEIGHT: 149.91 LBS | DIASTOLIC BLOOD PRESSURE: 83 MMHG | TEMPERATURE: 99 F | HEIGHT: 66 IN | RESPIRATION RATE: 18 BRPM

## 2024-08-01 VITALS
HEART RATE: 76 BPM | RESPIRATION RATE: 18 BRPM | TEMPERATURE: 99 F | SYSTOLIC BLOOD PRESSURE: 123 MMHG | OXYGEN SATURATION: 100 % | DIASTOLIC BLOOD PRESSURE: 80 MMHG

## 2024-08-01 LAB
ALBUMIN SERPL ELPH-MCNC: 3.6 G/DL — SIGNIFICANT CHANGE UP (ref 3.3–5)
ALP SERPL-CCNC: 55 U/L — SIGNIFICANT CHANGE UP (ref 40–120)
ALT FLD-CCNC: 21 U/L — SIGNIFICANT CHANGE UP (ref 12–78)
ANION GAP SERPL CALC-SCNC: 4 MMOL/L — LOW (ref 5–17)
APPEARANCE UR: CLEAR — SIGNIFICANT CHANGE UP
AST SERPL-CCNC: 17 U/L — SIGNIFICANT CHANGE UP (ref 15–37)
BASOPHILS # BLD AUTO: 0.04 K/UL — SIGNIFICANT CHANGE UP (ref 0–0.2)
BASOPHILS NFR BLD AUTO: 0.9 % — SIGNIFICANT CHANGE UP (ref 0–2)
BILIRUB SERPL-MCNC: 0.2 MG/DL — SIGNIFICANT CHANGE UP (ref 0.2–1.2)
BILIRUB UR-MCNC: NEGATIVE — SIGNIFICANT CHANGE UP
BUN SERPL-MCNC: 6 MG/DL — LOW (ref 7–23)
CALCIUM SERPL-MCNC: 8.8 MG/DL — SIGNIFICANT CHANGE UP (ref 8.5–10.1)
CHLORIDE SERPL-SCNC: 110 MMOL/L — HIGH (ref 96–108)
CO2 SERPL-SCNC: 25 MMOL/L — SIGNIFICANT CHANGE UP (ref 22–31)
COLOR SPEC: YELLOW — SIGNIFICANT CHANGE UP
CREAT SERPL-MCNC: 0.64 MG/DL — SIGNIFICANT CHANGE UP (ref 0.5–1.3)
DIFF PNL FLD: ABNORMAL
EGFR: 120 ML/MIN/1.73M2 — SIGNIFICANT CHANGE UP
EOSINOPHIL # BLD AUTO: 0.13 K/UL — SIGNIFICANT CHANGE UP (ref 0–0.5)
EOSINOPHIL NFR BLD AUTO: 2.9 % — SIGNIFICANT CHANGE UP (ref 0–6)
GLUCOSE SERPL-MCNC: 81 MG/DL — SIGNIFICANT CHANGE UP (ref 70–99)
GLUCOSE UR QL: NEGATIVE MG/DL — SIGNIFICANT CHANGE UP
HCG UR QL: NEGATIVE — SIGNIFICANT CHANGE UP
HCT VFR BLD CALC: 34.7 % — SIGNIFICANT CHANGE UP (ref 34.5–45)
HGB BLD-MCNC: 11 G/DL — LOW (ref 11.5–15.5)
IMM GRANULOCYTES NFR BLD AUTO: 0.2 % — SIGNIFICANT CHANGE UP (ref 0–0.9)
KETONES UR-MCNC: NEGATIVE MG/DL — SIGNIFICANT CHANGE UP
LEUKOCYTE ESTERASE UR-ACNC: NEGATIVE — SIGNIFICANT CHANGE UP
LYMPHOCYTES # BLD AUTO: 1.69 K/UL — SIGNIFICANT CHANGE UP (ref 1–3.3)
LYMPHOCYTES # BLD AUTO: 37.1 % — SIGNIFICANT CHANGE UP (ref 13–44)
MCHC RBC-ENTMCNC: 25.2 PG — LOW (ref 27–34)
MCHC RBC-ENTMCNC: 31.7 G/DL — LOW (ref 32–36)
MCV RBC AUTO: 79.4 FL — LOW (ref 80–100)
MONOCYTES # BLD AUTO: 0.34 K/UL — SIGNIFICANT CHANGE UP (ref 0–0.9)
MONOCYTES NFR BLD AUTO: 7.5 % — SIGNIFICANT CHANGE UP (ref 2–14)
NEUTROPHILS # BLD AUTO: 2.34 K/UL — SIGNIFICANT CHANGE UP (ref 1.8–7.4)
NEUTROPHILS NFR BLD AUTO: 51.4 % — SIGNIFICANT CHANGE UP (ref 43–77)
NITRITE UR-MCNC: NEGATIVE — SIGNIFICANT CHANGE UP
NRBC # BLD: 0 /100 WBCS — SIGNIFICANT CHANGE UP (ref 0–0)
PH UR: 6 — SIGNIFICANT CHANGE UP (ref 5–8)
PLATELET # BLD AUTO: 293 K/UL — SIGNIFICANT CHANGE UP (ref 150–400)
POTASSIUM SERPL-MCNC: 3.7 MMOL/L — SIGNIFICANT CHANGE UP (ref 3.5–5.3)
POTASSIUM SERPL-SCNC: 3.7 MMOL/L — SIGNIFICANT CHANGE UP (ref 3.5–5.3)
PROT SERPL-MCNC: 7.3 GM/DL — SIGNIFICANT CHANGE UP (ref 6–8.3)
PROT UR-MCNC: NEGATIVE MG/DL — SIGNIFICANT CHANGE UP
RBC # BLD: 4.37 M/UL — SIGNIFICANT CHANGE UP (ref 3.8–5.2)
RBC # FLD: 20.1 % — HIGH (ref 10.3–14.5)
SODIUM SERPL-SCNC: 139 MMOL/L — SIGNIFICANT CHANGE UP (ref 135–145)
SP GR SPEC: 1.01 — SIGNIFICANT CHANGE UP (ref 1–1.03)
UROBILINOGEN FLD QL: 0.2 MG/DL — SIGNIFICANT CHANGE UP (ref 0.2–1)
WBC # BLD: 4.55 K/UL — SIGNIFICANT CHANGE UP (ref 3.8–10.5)
WBC # FLD AUTO: 4.55 K/UL — SIGNIFICANT CHANGE UP (ref 3.8–10.5)

## 2024-08-01 PROCEDURE — 93971 EXTREMITY STUDY: CPT | Mod: 26,LT

## 2024-08-01 PROCEDURE — 93010 ELECTROCARDIOGRAM REPORT: CPT

## 2024-08-01 PROCEDURE — 99284 EMERGENCY DEPT VISIT MOD MDM: CPT

## 2024-08-01 PROCEDURE — 76830 TRANSVAGINAL US NON-OB: CPT | Mod: 26

## 2024-08-01 RX ORDER — SODIUM CHLORIDE 0.9 % (FLUSH) 0.9 %
1000 SYRINGE (ML) INJECTION ONCE
Refills: 0 | Status: COMPLETED | OUTPATIENT
Start: 2024-08-01 | End: 2024-08-01

## 2024-08-01 RX ADMIN — Medication 600 MILLIGRAM(S): at 17:26

## 2024-08-01 RX ADMIN — Medication 1000 MILLILITER(S): at 13:30

## 2024-08-01 RX ADMIN — Medication 1000 MILLILITER(S): at 12:28

## 2024-08-01 NOTE — ED PROVIDER NOTE - PATIENT PORTAL LINK FT
You can access the FollowMyHealth Patient Portal offered by [facility] by registering at the following website: [email or web address]. By joining [facility]’s FollowMyHealth portal, you will also be able to view your health information using other applications (apps) compatible with our system.

## 2024-08-01 NOTE — ED PROVIDER NOTE - NS ED ATTENDING STATEMENT MOD
Attending Only I have seen and examined this patient and fully participated in the care of this patient as the teaching attending.  The service was shared with the SIMBA.  I reviewed and verified the documentation.

## 2024-08-01 NOTE — ED ADULT TRIAGE NOTE - CHIEF COMPLAINT QUOTE
pt c/o  excessive vaginal bleeding, pelvic pain, palpitation and left leg numbness since yesterday at 9pm.  denies weakness or facial droop. pt is currently on her period. history fibroid, thyroid disease.

## 2024-08-01 NOTE — ED PROVIDER NOTE - PROGRESS NOTE DETAILS
AIDEE Rodriguez: patient requesting female provider for pelvic exam, requested for GYN exam by Dr Bell. In speculum exam, cervical os closed with bleeding, no clots, some blood in vaginal vault. On bimanual exam, no cervical motion tenderness, bilateral adnexal tenderness, R > L side.

## 2024-08-01 NOTE — ED ADULT NURSE NOTE - OBJECTIVE STATEMENT
IV Cipro   33 year old old with Asthma. Pt with c/o of menorrhagia, LMP 7/29. Pt states her menses are usually no this heavy, currently soaking more than a pad an hour since yesterday. Pt endorses she also has had intermittent chest pain, nausea without vomiting and dizziness since yesterday, currently denies chest pain and SOB. Pt reports having thyroid nodule dx 06/23, not on any medications.

## 2024-08-01 NOTE — ED PROVIDER NOTE - NSFOLLOWUPINSTRUCTIONS_ED_ALL_ED_FT
Follow-up with your PCP in the next 2 to 4 days.    Please return to the emergency department if you start experiencing worsening vaginal bleeding.  Please return to the emergency department if you start experiencing worsening pain.  I do recommend following up with your gynecologist in regards to this vaginal bleeding.    You were seen here today for vaginal bleeding.

## 2024-08-01 NOTE — ED PROVIDER NOTE - PHYSICAL EXAMINATION
GENERAL: The patient appears well and is in no apparent distress.    EYES: Pupils equal and reactive.  Extraocular eye movements are intact.    ENT: Head is atraumatic.  Posterior oropharynx is unremarkable.  Tympanic membranes are visualized bilaterally without evidence of inflammation or infection.    RESPIRATORY: Lungs are clear to auscultation bilaterally.  The patient has no significant wheezing, rhonchi, or rales.    CARDIOVASCULAR: The patient has a regular rate and rhythm with no significant murmurs, rubs, or gallops.    ABDOMEN: Abdomen is soft and nondistended.  Nonperitoneal.  Patient has no focal areas of tenderness in all 4 quadrants however does have some mild tenderness in the left groin.    SKIN: Skin is intact without evidence of significant lacerations or sores.    MUSCULOSKELETAL: Patient has good range of motion of all extremities.  The patient has good distal cap refill.  The patient has palpable distal pulses.  No obvious edema is noted.    NEUROLOGIC: Cranial nerves II through XII are grossly within normal limits.  No difference in sharp or dull sensation between the bilateral lower extremities. She has no difference in sharp or dull sensation between the bilateral upper extremities.     PSYCHIATRIC: Patient is awake, alert, and oriented ×4.

## 2024-08-01 NOTE — ED PROVIDER NOTE - CLINICAL SUMMARY MEDICAL DECISION MAKING FREE TEXT BOX
This patient is a 33-year-old female coming in today with with vaginal bleeding.  Nursing notes and vital signs were reviewed for this patient.  Hemoglobin was noted to be 11.0.  This is elevated for this patient's last hemoglobin noted to be at roughly 9.  Does has a history of iron deficiency anemia.  He is on iron tablets at home.  Urinalysis shows no evidence of urinary tract infection.  There is only few bacteria and squamous epithelial cells however no nitrites were seen.  The presence of squamous epithelial cells also indicates that this may not be a clean-catch.  Patient has no evidence of significant electrolyte abnormalities.  Able to ambulate without any difficulty.  She has no difference in sensation to light and painful touch in her bilateral lower or upper extremities.    On my reevaluation of this patient, this patient states that she actually was on a plane roughly 1 week ago.  She flew to [place] from [facility].  DVT study was taken of her left lower extremity and no evidence of DVT was seen.  The PA performed a speculum exam on this patient.  Patient requested that a female provider performed a speculum exam and declined my performing of the exam.  Please see the PA section of this note that indicates her findings.  No significant bleeding was seen on the PA's exam.  Did have some minor adnexal tenderness.  Transvaginal ultrasound was performed that shows fibroid uterus with a right ovarian dermoid cyst.  She has a known history of this cyst.  Her pain is located over the area of the cyst.  No evidence of hemorrhagic transformation of the cyst.  At this time, we believe that the patient is safe for discharge home with outpatient follow-up with her PCP.  She expresses understanding and is amenable to this plan.  She was given Motrin for pain control.  States that their pain is improved after the Motrin.  Patient is well-appearing and nontoxic at this time.  She will also follow-up outpatient with her GYN.

## 2024-08-01 NOTE — ED ADULT NURSE NOTE - CHPI ED NUR SYMPTOMS POS
How Severe Are Your Spot(S)?: moderate What Type Of Note Output Would You Prefer (Optional)?: Standard Output What Is The Reason For Today's Visit?: Full Body Skin Examination What Is The Reason For Today's Visit? (Being Monitored For X): concerning skin lesions on an annual basis CRAMPS/PELVIC PAIN - - -

## 2024-08-01 NOTE — ED PROVIDER NOTE - OBJECTIVE STATEMENT
This patient is a 33-year-old female presenting to the emergency room today for vaginal bleeding.  She has no relevant past medical history.  Only takes iron supplements at home.  States that her period started roughly 3 days ago.  She states that her periods are very irregular.  She is not on any birth control.  Periods last roughly 3 days.  She has normal flow, however has been having heavy flow for the last several months due to emotional stress.  She states that 3 days ago she started her period and has been soaking through roughly 2 pads an hour since last night.  She is concerned by the increase in bleeding and for that she has not she came to the emergency department for further evaluation.    She states that she was seen by her GYN recently and diagnosed with fibroids as well as potential ovarian cyst.  She was also told that she may have a nodule on her thyroid.    She states that last night she started noticing some numbness in her left lower extremity.  No numbness in any other extremity.  No associated pain.  She states that she has no headaches.  No lightheadedness or dizziness.  No chest pain or shortness of breath.  States that she has no changes to her vision or hearing.  No abdominal pain at this time.  She states that she has no other complaints, however did notice some minor palpitations yesterday.  No associated chest pain or shortness of breath once again.  She states that she has no complaints and no palpitations today.